# Patient Record
Sex: FEMALE | Race: WHITE | NOT HISPANIC OR LATINO | Employment: PART TIME | URBAN - METROPOLITAN AREA
[De-identification: names, ages, dates, MRNs, and addresses within clinical notes are randomized per-mention and may not be internally consistent; named-entity substitution may affect disease eponyms.]

---

## 2017-04-27 ENCOUNTER — GENERIC CONVERSION - ENCOUNTER (OUTPATIENT)
Dept: OTHER | Facility: OTHER | Age: 37
End: 2017-04-27

## 2017-05-03 ENCOUNTER — GENERIC CONVERSION - ENCOUNTER (OUTPATIENT)
Dept: OTHER | Facility: OTHER | Age: 37
End: 2017-05-03

## 2017-06-14 ENCOUNTER — LAB REQUISITION (OUTPATIENT)
Dept: LAB | Facility: HOSPITAL | Age: 37
End: 2017-06-14
Payer: COMMERCIAL

## 2017-06-14 ENCOUNTER — ALLSCRIPTS OFFICE VISIT (OUTPATIENT)
Dept: OTHER | Facility: OTHER | Age: 37
End: 2017-06-14

## 2017-06-14 DIAGNOSIS — D48.5 NEOPLASM OF UNCERTAIN BEHAVIOR OF SKIN: ICD-10-CM

## 2017-06-14 PROCEDURE — 88305 TISSUE EXAM BY PATHOLOGIST: CPT | Performed by: SPECIALIST

## 2017-10-09 ENCOUNTER — TRANSCRIBE ORDERS (OUTPATIENT)
Dept: ADMINISTRATIVE | Facility: HOSPITAL | Age: 37
End: 2017-10-09

## 2017-10-09 DIAGNOSIS — N64.4 BREAST PAIN, LEFT: Primary | ICD-10-CM

## 2017-10-11 ENCOUNTER — HOSPITAL ENCOUNTER (OUTPATIENT)
Dept: ULTRASOUND IMAGING | Facility: CLINIC | Age: 37
Discharge: HOME | End: 2017-10-11
Payer: COMMERCIAL

## 2017-10-11 ENCOUNTER — HOSPITAL ENCOUNTER (OUTPATIENT)
Dept: MAMMOGRAPHY | Facility: CLINIC | Age: 37
Discharge: HOME | End: 2017-10-11
Payer: COMMERCIAL

## 2017-10-11 DIAGNOSIS — N64.4 BREAST PAIN, LEFT: ICD-10-CM

## 2017-10-11 DIAGNOSIS — N64.4 MASTODYNIA: ICD-10-CM

## 2017-10-11 PROCEDURE — G0279 TOMOSYNTHESIS, MAMMO: HCPCS

## 2017-10-11 PROCEDURE — 76642 ULTRASOUND BREAST LIMITED: CPT

## 2017-10-11 PROCEDURE — G0204 DX MAMMO INCL CAD BI: HCPCS

## 2018-01-13 VITALS
SYSTOLIC BLOOD PRESSURE: 116 MMHG | RESPIRATION RATE: 16 BRPM | DIASTOLIC BLOOD PRESSURE: 77 MMHG | WEIGHT: 124.38 LBS | HEART RATE: 79 BPM | HEIGHT: 64 IN | BODY MASS INDEX: 21.24 KG/M2 | TEMPERATURE: 98.4 F

## 2018-01-15 NOTE — MISCELLANEOUS
Message   Date: 27 Apr 2017 9:59 AM EST, Recorded By: Elaine Cruz For: Sheree Noble, Reynaldo   Phone: [phone] (Home), [phone] (Work)   Reason: Medical Complaint   patient called c/o cough especially at night with some tightness in her chest   she states she is not sick, no congestion  She believes it is allergy related  She is using the Claritan and the Flonase  She is going to the [place] tomorrow and does not want to have a coughing fit while there  She is asking for an inhaler to use      As per Dr Luke Machado, she will prescribe Ventolin inhaler        Active Problems    1  Acid reflux disease (530 81) (K21 9)   2  Acute bronchitis (466 0) (J20 9)   3  Acute conjunctivitis (372 00) (H10 30)   4  Influenza-like illness (799 89) (R69)   5  Lumbago (724 2) (M54 5)   6  Need for DTaP vaccine (V06 1) (Z23)   7  Need for prophylactic vaccination and inoculation against influenza (V04 81) (Z23)   8  Need for Tdap vaccination (V06 1) (Z23)   9  Screening for hyperlipidemia (V77 91) (Z13 220)   10  Screening for hypothyroidism (V77 0) (Z13 29)    Current Meds   1  Fluticasone Propionate 50 MCG/ACT Nasal Suspension; USE 2 SPRAYS IN EACH   NOSTRIL ONCE DAILY; Therapy: 95LDR9133 to (Last Rx:70Xyn5341)  Requested for: 93Dte0780 Ordered   2  NexIUM 40 MG Oral Capsule Delayed Release; TAKE 1 CAPSULE DAILY; Therapy: 62FLK8965 to (Evaluate:45Gwz0840)  Requested for: 64MSK1454; Last   Rx:22Ekb2058 Ordered   3  Prenatal Multivitamin-Ultra TABS; Therapy: (Recorded:90Nys2613) to Recorded   4  Ventolin  (90 Base) MCG/ACT Inhalation Aerosol Solution; INHALE 1 TO 2   PUFFS EVERY 4 TO 6 HOURS AS NEEDED; Therapy: 21Erd0966 to (Last Rx:94Kxu0042)  Requested for: 41Opc2517 Ordered    Allergies    1   Amoxicillin CAPS    Signatures   Electronically signed by : GHANSHYAM Orta ; Apr 27 2017 10:55AM EST                       (Author)

## 2018-01-16 NOTE — PROGRESS NOTES
Assessment    1  Acute bronchitis (466 0) (J20 9)    Plan  Acute bronchitis    · Azithromycin 250 MG Oral Tablet; TAKE 2 TABLETS ON DAY 1 THEN TAKE 1  TABLET A DAY FOR 4 DAYS   · ProAir RespiClick 270 (90 Base) MCG/ACT Inhalation Aerosol Powder Breath  Activated; Inhale 1-2 puffs every 4-6 hours for cough and wheezing   · * XR CHEST PA & LATERAL; Status:Active; Requested for:22Jan2016;     Discussion/Summary    The high fevers with chest tightness is concerning  Chest xray to rule out pneumonia  Will start azithromycin  Very minimal excretion in breast milk so will be safe  Proair respiclick instead of symbicort  Continue tylenol, advil for symptomatic control  Possible side effects of new medications were reviewed with the patient/guardian today  The treatment plan was reviewed with the patient/guardian  The patient/guardian understands and agrees with the treatment plan      Chief Complaint  fever since 1/19/16 to Urgent Care, negative for influenza,   today has cough, green phlegm, chills, joints burn      History of Present Illness  HPI: Ms Demetria Naik comes for an acute visit  For the last 3 days she has had fever, bodyaches  Since yesterday she started with a cough with minimal greenish sputum  She also experienced chest tightness  She was seen in urgent care and tested for influenza on 1/20/16 which was negative  She is using symbicort but she thinks she is not able to take it in well as she coughs out right away  She denies any dysuria or diarrhea  Breastfeeding a 11 month old son who is in good health lately  Review of Systems    Constitutional: fever, feeling poorly, chills and feeling tired  ENT: nasal discharge, but no sore throat  Cardiovascular: no chest pain and no palpitations  Respiratory: cough, but no shortness of breath, no wheezing and no shortness of breath during exertion  Gastrointestinal: no abdominal pain and no diarrhea  Genitourinary: no dysuria     Musculoskeletal: myalgias  Active Problems    1  Acute conjunctivitis (372 00) (H10 30)   2  Esophageal reflux (530 81) (K21 9)   3  Influenza-like illness (487 1) (J11 1)   4  Lumbago (724 2) (M54 5)   5  Need for DTaP vaccine (V06 1) (Z23)   6  Need for prophylactic vaccination and inoculation against influenza (V04 81) (Z23)   7  Need for Tdap vaccination (V06 1) (Z23)    Past Medical History  Active Problems And Past Medical History Reviewed: The active problems and past medical history were reviewed and updated today  Social History    · Being A Social Drinker   · Marital History - Currently    · Never A Smoker   · Working Full Time  The social history was reviewed and updated today  The social history was reviewed and is unchanged  Family History  Family History Reviewed: The family history was reviewed and updated today  Current Meds   1  Fluticasone Propionate 50 MCG/ACT Nasal Suspension; USE 2 SPRAYS IN EACH   NOSTRIL ONCE DAILY; Therapy: 32WPN3756 to (Last Rx:51Pdd8843)  Requested for: 15Wgs1856 Ordered   2  NexIUM 40 MG Oral Capsule Delayed Release; TAKE 1 CAPSULE DAILY; Therapy: 32IWO4934 to (Evaluate:10Aug2014)  Requested for: 57KHZ7506; Last   Rx:76Csj9487 Ordered   3  Prenatal Multivitamin-Ultra TABS; Therapy: (Recorded:71Vme4038) to Recorded    The medication list was reviewed and updated today  Allergies    1  Amoxicillin CAPS    Vitals   Recorded: 47BFY9901 10:43AM   Temperature 102 F   Heart Rate 116   Respiration 16   Systolic 618   Diastolic 80   Height 5 ft 4 in   Weight 124 lb 4 00 oz   BMI Calculated 21 33   BSA Calculated 1 61     Physical Exam    Constitutional   General appearance: No acute distress, well appearing and well nourished  Eyes   Conjunctiva and lids: No swelling, erythema or discharge  Pupils and irises: Equal, round and reactive to light      Ears, Nose, Mouth, and Throat   External inspection of ears and nose: Normal     Otoscopic examination: Tympanic membranes translucent with normal light reflex  Canals patent without erythema  Oropharynx: Abnormal   The posterior pharynx was erythematous, but did not have an exudate  Oral mucosa was moist, but was normal    Pulmonary   Respiratory effort: Abnormal   Respiratory rate: normal  Respiratory Findings: dry cough  Auscultation of lungs: Clear to auscultation  Cardiovascular   Auscultation of heart: Normal rate and rhythm, normal S1 and S2, without murmurs  Examination of extremities for edema and/or varicosities: Normal     Abdomen   Abdomen: Non-tender, no masses  Liver and spleen: No hepatomegaly or splenomegaly           Signatures   Electronically signed by : GHANSHYAM Marquis ; Jan 22 2016 11:29AM EST                       (Author)

## 2018-01-16 NOTE — MISCELLANEOUS
Message  Ms Zuleima Hampton called stating that she still has cough which seems to be worse yesterday and today  No fever and feels much better in her overall energy level  I let her know that sometimes the cough may take sometime to completely resolve  She finished the course of antibiotics and using symbicort and proair as needed  I asked her to schedule a follow up appointment 6-8 weeks from initial visit and to get an chest xray before that  We will mail her the slip for the xray      Plan  Community acquired pneumonia    · * XR CHEST PA & LATERAL; Status:Active;  Requested for:11Mar2016;     Signatures   Electronically signed by : GHANSHYAM Melo ; Feb  3 2016 10:04AM EST                       (Author)

## 2018-01-16 NOTE — RESULT NOTES
Message      Recorded as Task   Date: 05/03/2016 02:06 PM, Created By: Erik Mosquera   Task Name: Follow Up   Assigned To: Vidal Lama   Regarding Patient: Annalee Zamora, Status: In Progress   Josefaald Min - 03 May 2016 2:06 PM     TASK CREATED  Please let her know that blood work was all within normal limits  Neto Shemar - 05 May 2016 9:38 AM     TASK EDITED  Call patient and left a message for her to call back  Alena Ferreira - 05 May 2016 9:38 AM     TASK IN PROGRESS   Alena Ferreira - 05 May 2016 10:31 AM     TASK EDITED  Patient call back told the patient and let her know that her blood work was within normal limits

## 2018-01-18 NOTE — PROGRESS NOTES
Assessment    1  Influenza-like illness (487 1) (J11 1)    Plan  Influenza-like illness    · Drink at least 6 glasses of water or juice a day ; Status:Complete;   Done: 42ORG1161   · Good hand washing is one of the best ways to control the spread of germs ;  Status:Complete;   Done: 00IUK9552   · Rest in bed until your temperature returns to normal ; Status:Complete;   Done:  79TAP9877   · Stay home from work or school until your condition is improved ; Status:Complete;    Done: 43MIL6052   · The following home treatments may soothe a sore throat ; Status:Complete;   Done:  04DCA3507   · Use a cool mist humidifier in the room ; Status:Complete;   Done: 48LIM3032   · You may slowly resume your normal level of activity once you feel better ;  Status:Complete;   Done: 91OJT0554   · Call [phone] if: The cough is getting worse ; Status:Complete;   Done:  84ITX6568   · Call [phone] if: The cough is not gone in 10 days ; Status:Complete;   Done:  27UAG2085   · Call [phone] if: The fever comes back after being normal for 2 days ;  Status:Complete;   Done: 32SFF7891   · Call [phone] if:  The fever has not gone away in 2 days ; Status:Complete;   Done:  26QGL5478   · Call [phone] if: You have a productive cough and are bringing up secretions  (phlegm) ; Status:Complete;   Done: 40HHX8507   · Call [phone] if: You have pain in your ear ; Status:Complete;   Done: 25EJU9003   · Call [phone] if: You have pain in your face, cheeks or forehead ; Status:Complete;    Done: 01QCB7428   · Call [phone] if: You start vomiting ; Status:Complete;   Done: 14QQY1042   · Call [phone] if: Your temperature is higher than 101F ; Status:Complete;   Done:  36VEK5904   · Seek Immediate Medical Attention if: Breathing starts to have a wheeze or whistling  sound ; Status:Complete;   Done: 74YVK5421   · Seek Immediate Medical Attention if: The fever continues or becomes higher ;  Status:Complete;   Done: 39MWU1842   · Seek Immediate Medical Attention if: You are feeling short of breath ; Status:Complete;    Done: 43VFX9656   · Seek Immediate Medical Attention if: You become dehydrated ; Status:Complete;   Done:  08DDX4567   · Seek Immediate Medical Attention if: You feel short of breath even while resting ;  Status:Complete;   Done: 18PWK3345   · Seek Immediate Medical Attention if: You have a fever, headache, and vomiting, or have a  stiff neck ; Status:Complete;   Done: 00QFA2241   · Seek Immediate Medical Attention if: You have pain in the chest that gets worse with  deep breathing or coughing ; Status:Complete;   Done: 09GBE4749   · (1) INFLUENZA A/B AND RSV, PCR; Status:Active - Retrospective By Protocol  Authorization; Requested KLX:[id number];     Discussion/Summary  Discussion Summary:   Patient symptoms consistent with an influenza-like illness  Flu swab obtained and sent for culture  Increase fluids and rest  Tylenol and Advil as needed for fever  Monitor for fever, chills, worsening of current symptoms, difficulty breathing or swelling  With these symptoms, or no improvement in 3-5 days, follow up with PCP or ER for further evaluation  We will contact you with positive test results only  Medication Side Effects Reviewed: Possible side effects of new medications were reviewed with the patient/guardian today  Understands and agrees with treatment plan: The treatment plan was reviewed with the patient/guardian  The patient/guardian understands and agrees with the treatment plan   Counseling Documentation With Imm: The patient was counseled regarding instructions for management, risk factor reductions, prognosis, patient and family education, impressions, risks and benefits of treatment options, importance of compliance with treatment  Follow Up Instructions: Follow Up with your Primary Care Provider in 3-5 days     If your symptoms worsen, go to the [place] [address] Emergency Department  Chief Complaint    1  Fever  Chief Complaint Free Text Note Form: Patient here with a fever and chills for 2 days  She has been taking Ibuprofen for same  Denies pain  History of Present Illness  HPI: 52-year-old female presents with two-day history of fever, chills  MAXIMUM TEMPERATURE 102  She denies cough, rhinorrhea, sore throat, chest pain, shortness of breath, abdominal pain, nausea, vomiting  Tolerating by mouth diet  Positive sick contacts at home  Ibuprofen helps with the fever       Hospital Based Practices Required Assessment:   Pain Assessment   the patient states they do not have pain  Abuse And Domestic Violence Screen    Yes, the patient is safe at home  The patient states no one is hurting them  Depression And Suicide Screen  No, the patient has not had thoughts of hurting themself  No, the patient has not felt depressed in the past 7 days  Prefered Language is  [place]  Primary Language is  English  Review of Systems  Focused-Female:   Constitutional: as noted in HPI    ENT: as noted in HPI  Cardiovascular: as noted in HPI  Respiratory: as noted in HPI  Gastrointestinal: as noted in HPI  Active Problems    1  Acute conjunctivitis (372 00) (H10 30)   2  Esophageal reflux (530 81) (K21 9)   3  Lumbago (724 2) (M54 5)   4  Need for DTaP vaccine (V06 1) (Z23)   5  Need for prophylactic vaccination and inoculation against influenza (V04 81) (Z23)   6  Need for Tdap vaccination (V06 1) (Z23)    Past Medical History    1  Nasal congestion (478 19) (R09 81)    Family History    1  Family history of Dyslipidemia    2  Family history of Asthma (V17 5)    3  Family history of Congestive Heart Failure    Social History    · Being A Social Drinker   · Marital History - Currently    · Never A Smoker   · Working Full Time    Current Meds   1  Fluticasone Propionate 50 MCG/ACT Nasal Suspension; USE 2 SPRAYS IN EACH   NOSTRIL ONCE DAILY;    Therapy: 67MPD1760 to (Last Rx:77Pga9038)  Requested for: 42Mks6094 Ordered   2  NexIUM 40 MG Oral Capsule Delayed Release; TAKE 1 CAPSULE DAILY; Therapy: 53NNM2855 to (Evaluate:09Mtq2315)  Requested for: 13MGA5879; Last   Rx:40Cqw7221 Ordered   3  Prenatal Multivitamin-Ultra TABS; Therapy: (Recorded:30Syw3793) to Recorded    Allergies    1  Amoxicillin CAPS    Vitals  Signs [Data Includes: Current Encounter]   Recorded: 25FEC8470 09:25PM   Temperature: 100 9 F  Heart Rate: 116  Respiration: 16  Systolic: 924  Diastolic: 66  Height: 5 ft 4 in  Weight: 125 lb   BMI Calculated: 21 46  BSA Calculated: 1 6  O2 Saturation: 97  Pain Scale: 0    Physical Exam    Constitutional   General appearance: No acute distress, well appearing and well nourished  Eyes   Conjunctiva and lids: No swelling, erythema or discharge  Pupils and irises: Equal, round and reactive to light  Ears, Nose, Mouth, and Throat   External inspection of ears and nose: Normal     Otoscopic examination: Tympanic membranes translucent with normal light reflex  Canals patent without erythema  Nasal mucosa, septum, and turbinates: Normal without edema or erythema  Oropharynx: Normal with no erythema, edema, exudate or lesions  Pulmonary   Respiratory effort: No increased work of breathing or signs of respiratory distress  Auscultation of lungs: Clear to auscultation  Cardiovascular   Auscultation of heart: Normal rate and rhythm, normal S1 and S2, without murmurs  Lymphatic   Palpation of lymph nodes in neck: No lymphadenopathy         Signatures   Electronically signed by : RICKI Liu; Jan 20 2016  9:47PM EST                       (Author)    Electronically signed by : GHANSHYAM Goins ; Jan 21 2016  9:47AM EST                       (Co-author)

## 2018-10-11 ENCOUNTER — IMMUNIZATION (OUTPATIENT)
Dept: INTERNAL MEDICINE CLINIC | Facility: CLINIC | Age: 38
End: 2018-10-11
Payer: COMMERCIAL

## 2018-10-11 DIAGNOSIS — Z23 ENCOUNTER FOR IMMUNIZATION: ICD-10-CM

## 2018-10-11 PROCEDURE — 90682 RIV4 VACC RECOMBINANT DNA IM: CPT | Performed by: INTERNAL MEDICINE

## 2018-10-11 PROCEDURE — 90471 IMMUNIZATION ADMIN: CPT | Performed by: INTERNAL MEDICINE

## 2019-01-21 ENCOUNTER — OFFICE VISIT (OUTPATIENT)
Dept: INTERNAL MEDICINE CLINIC | Facility: CLINIC | Age: 39
End: 2019-01-21
Payer: COMMERCIAL

## 2019-01-21 VITALS
HEIGHT: 64 IN | WEIGHT: 127 LBS | RESPIRATION RATE: 14 BRPM | SYSTOLIC BLOOD PRESSURE: 100 MMHG | TEMPERATURE: 98.4 F | HEART RATE: 100 BPM | DIASTOLIC BLOOD PRESSURE: 69 MMHG | BODY MASS INDEX: 21.68 KG/M2

## 2019-01-21 DIAGNOSIS — J30.9 ALLERGIC RHINITIS, UNSPECIFIED SEASONALITY, UNSPECIFIED TRIGGER: ICD-10-CM

## 2019-01-21 DIAGNOSIS — K21.9 GASTROESOPHAGEAL REFLUX DISEASE WITHOUT ESOPHAGITIS: Primary | ICD-10-CM

## 2019-01-21 PROBLEM — H65.02 ACUTE SEROUS OTITIS MEDIA OF LEFT EAR: Status: ACTIVE | Noted: 2019-01-21

## 2019-01-21 PROCEDURE — 99214 OFFICE O/P EST MOD 30 MIN: CPT | Performed by: INTERNAL MEDICINE

## 2019-01-21 PROCEDURE — 1036F TOBACCO NON-USER: CPT | Performed by: INTERNAL MEDICINE

## 2019-01-21 PROCEDURE — 3008F BODY MASS INDEX DOCD: CPT | Performed by: INTERNAL MEDICINE

## 2019-01-21 RX ORDER — LORATADINE 10 MG/1
10 TABLET ORAL DAILY
COMMUNITY

## 2019-01-21 RX ORDER — AZELASTINE 1 MG/ML
1 SPRAY, METERED NASAL 2 TIMES DAILY
Qty: 1 BOTTLE | Refills: 2 | Status: SHIPPED | OUTPATIENT
Start: 2019-01-21 | End: 2021-03-29 | Stop reason: SDUPTHER

## 2019-01-21 RX ORDER — NORETHINDRONE ACETATE AND ETHINYL ESTRADIOL 1MG-20(21)
1 KIT ORAL DAILY
COMMUNITY

## 2019-01-21 RX ORDER — FLUTICASONE PROPIONATE 50 MCG
2 SPRAY, SUSPENSION (ML) NASAL DAILY
Qty: 1 BOTTLE | Refills: 2 | Status: SHIPPED | OUTPATIENT
Start: 2019-01-21 | End: 2021-10-13

## 2019-01-21 NOTE — PROGRESS NOTES
Assessment/Plan:    Acute serous otitis media of left ear  Had pain in the left ear this morning with nasal congestion for about a week or so ago with a background history of some allergic rhinitis to Claritin D without much relieve and ibuprofen will going to start her on Flonase nasal spray and Astelin nasal spray continue on Claritin or Zyrtec if symptoms persist consider Singulair long-term  If you develop fever or yellow drainage will send in an antibiotic call us with a with progress report before the weekend       Diagnoses and all orders for this visit:    Gastroesophageal reflux disease without esophagitis    Allergic rhinitis, unspecified seasonality, unspecified trigger  -     fluticasone (FLONASE) 50 mcg/act nasal spray; 2 sprays into each nostril daily for 30 days  -     azelastine (ASTELIN) 0 1 % nasal spray; 1 spray into each nostril 2 (two) times a day for 30 days Use in each nostril as directed    Other orders  -     norethindrone-ethinyl estradiol (JUNEL FE 1/20) 1-20 MG-MCG per tablet; Take 1 tablet by mouth daily  -     loratadine (CLARITIN) 10 mg tablet; Take 10 mg by mouth daily          Subjective:      Patient ID: Marlys Chaney is a 41 y o  female      Chief Complaint   Patient presents with    Other     possible pink eye-right eye    Earache     left ear    Facial Pain         Current Outpatient Prescriptions:     loratadine (CLARITIN) 10 mg tablet, Take 10 mg by mouth daily, Disp: , Rfl:     norethindrone-ethinyl estradiol (JUNEL FE 1/20) 1-20 MG-MCG per tablet, Take 1 tablet by mouth daily, Disp: , Rfl:     azelastine (ASTELIN) 0 1 % nasal spray, 1 spray into each nostril 2 (two) times a day for 30 days Use in each nostril as directed, Disp: 1 Bottle, Rfl: 2    fluticasone (FLONASE) 50 mcg/act nasal spray, 2 sprays into each nostril daily for 30 days, Disp: 1 Bottle, Rfl: 2    Came in urgently because she felt earache throbbing in her ear today she says some nasal congestion sinus pressure for about a week or so of her children has some cold recently  Has taking Claritin D came in for further evaluation  As taking some ibuprofen for pain  Appears nontoxic no fever  Has a background history of little bit allergic rhinitis at times no purulent discharge from her nose no cough no chest pain palpitation or shortness of breath  No GI symptoms fever or chills  We will do the following start her on Flonase nasal spray and Astelin nasal spray continue Claritin D take ibuprofen or Aleve for pain if symptoms persist could be a candidate for Singulair if you develop purulent discharge or fever call us and will send in an antibiotic  The following portions of the patient's history were reviewed and updated as appropriate: allergies, current medications, past family history, past medical history, past social history, past surgical history and problem list     Review of Systems   Constitutional: Negative  Negative for activity change, appetite change, fatigue, fever and unexpected weight change  HENT: Positive for ear pain, postnasal drip, rhinorrhea and sinus pressure  Negative for congestion, hearing loss, mouth sores, sore throat, trouble swallowing and voice change  Eyes: Negative for pain, redness and visual disturbance  Respiratory: Negative for cough, chest tightness, shortness of breath and wheezing  Cardiovascular: Negative for chest pain, palpitations and leg swelling  Gastrointestinal: Negative for abdominal distention, abdominal pain, blood in stool, constipation, diarrhea and nausea  Endocrine: Negative for cold intolerance, heat intolerance, polydipsia, polyphagia and polyuria  Genitourinary: Negative for difficulty urinating, dysuria, flank pain, frequency, hematuria and urgency  Musculoskeletal: Negative for arthralgias, back pain, gait problem, joint swelling and myalgias  Skin: Negative for color change and pallor     Neurological: Negative for dizziness, tremors, seizures, syncope, weakness, numbness and headaches  Hematological: Negative for adenopathy  Does not bruise/bleed easily  Psychiatric/Behavioral: Negative  Negative for sleep disturbance  The patient is not nervous/anxious  Objective:    Results for orders placed or performed in visit on 06/14/17   Tissue Exam   Result Value Ref Range    Case Report       Surgical Pathology Report                         Case: [id number]                                   Authorizing Provider:  Jose Elias  Collected:           06/14/2017                   Pathologist:           Letty Ellis MD           Received:            06/14/2017 1541              Specimen:    Skin, Other, Right neck                                                                    Final Diagnosis       A  Skin, Right neck, shave excision:  - Benign irritated keratosis, macular/evolving   - Negative for malignancy on multiple examined levels  Interpretation performed at [facility], [address] [place] [address]  Additional Information       These tests were developed and their performance characteristics determined by [facility] or [address] [place] [address]  They may not be cleared or approved by the [place]  Food and Drug Administration  The FDA has determined that such clearance or approval is not necessary  These tests are used for clinical purposes  They should not be regarded as investigational or for research  This laboratory has been approved by CLIA 88, designated as a high-complexity laboratory and is qualified to perform these tests  Gross Description       A  The specimen is received in formalin, labeled with the patient's name and hospital number, and is designated "right neck", is a shave biopsy of skin measuring 0 4 x 0 3 x 0 1 cm   The epidermal surface is tan-white and contains a 0 1 x 0 1 cm flesh-colored papule that is 0 1 cm away from the nearest resection margin  The resection margin is inked green  The specimen is entirely submitted  One cassette  The fixation time is unknown  RLR      Clinical Information DDX: BCC vs  AK  Notes: Please check margins        /69 (BP Location: Left arm, Patient Position: Sitting, Cuff Size: Standard)   Pulse 100   Temp 98 4 °F (36 9 °C)   Resp 14   Ht 5' 3 75" (1 619 m)   Wt 57 6 kg (127 lb)   BMI 21 97 kg/m²      Physical Exam   Constitutional: She is oriented to person, place, and time  She appears well-developed and well-nourished  HENT:   Head: Normocephalic  Right Ear: External ear normal    Left Ear: External ear normal    Nose: Nose normal    Mouth/Throat: Oropharynx is clear and moist  No oropharyngeal exudate  Tympanic members clear swollen nasal turbinates  Throat is clear   Eyes: Pupils are equal, round, and reactive to light  Conjunctivae and EOM are normal    Neck: Normal range of motion  Neck supple  No thyromegaly present  No lymphadenopathy   Cardiovascular: Normal rate, regular rhythm, normal heart sounds and intact distal pulses  Exam reveals no gallop and no friction rub  No murmur heard  Pulmonary/Chest: Effort normal and breath sounds normal  No respiratory distress  She has no wheezes  She has no rales  Abdominal: Soft  Bowel sounds are normal  She exhibits no distension and no mass  There is no tenderness  There is no rebound and no guarding  Musculoskeletal: Normal range of motion  Lymphadenopathy:     She has no cervical adenopathy  Neurological: She is alert and oriented to person, place, and time  Skin: Skin is warm and dry  Psychiatric: She has a normal mood and affect  Her behavior is normal  Judgment normal    Nursing note and vitals reviewed

## 2019-01-21 NOTE — PATIENT INSTRUCTIONS
Serous Otitis Media   WHAT YOU NEED TO KNOW:   Serous otitis media is fluid trapped behind your tympanic membrane (eardrum), without an ear infection  Your eardrum is in your middle ear  Serous otitis media is also called otitis media with effusion  You may have fluid in your ear for months, but it usually goes away on its own  The fluid may be in one or both ears  The fluid may cause muffled sounds, and you may feel like your ears are full  Serous otitis media may be caused by an upper respiratory infection or allergies  It is most common in the fall and early spring  DISCHARGE INSTRUCTIONS:   Return to the emergency department if:   · You have a fever  · You have a sudden loss of hearing in your affected ear  · You develop a severe headache and stiff neck  · You have a seizure  Contact your healthcare provider if:   · You have fluid draining from your ear  · You have new symptoms  · You have questions or concerns about your condition or care  Follow up with your healthcare provider as directed: Your ears will need to be checked regularly  You may need to see a specialist  Write down your questions so you remember to ask them during your visits  © 2017 [facility] Information is for End User's use only and may not be sold, redistributed or otherwise used for commercial purposes  All illustrations and images included in CareNotes® are the copyrighted property of A D A M , Inc  or Tejinderjorge l [employer]man  The above information is an  only  It is not intended as medical advice for individual conditions or treatments  Talk to your doctor, nurse or pharmacist before following any medical regimen to see if it is safe and effective for you      Take her Flonase nasal spray 2 puffs in each nostril daily    Get take her Astelin nasal spray 2 puffs in each nostril daily    Continue Claritin D    May take ibuprofen for on Aleve for pain    If you develop fever or yellow drainage call us before the weekend and will start antibiotics    Symptoms recurred you may be a candidate for Singulair long-term to avoid allergies    And/or ENT evaluation

## 2019-01-21 NOTE — ASSESSMENT & PLAN NOTE
Had pain in the left ear this morning with nasal congestion for about a week or so ago with a background history of some allergic rhinitis to Claritin D without much relieve and ibuprofen will going to start her on Flonase nasal spray and Astelin nasal spray continue on Claritin or Zyrtec if symptoms persist consider Singulair long-term    If you develop fever or yellow drainage will send in an antibiotic call us with a with progress report before the weekend

## 2019-01-31 ENCOUNTER — TELEPHONE (OUTPATIENT)
Dept: INTERNAL MEDICINE CLINIC | Facility: CLINIC | Age: 39
End: 2019-01-31

## 2019-01-31 DIAGNOSIS — J20.8 ACUTE BRONCHITIS DUE TO OTHER SPECIFIED ORGANISMS: Primary | ICD-10-CM

## 2019-01-31 RX ORDER — PREDNISONE 10 MG/1
TABLET ORAL
Qty: 30 TABLET | Refills: 0 | Status: SHIPPED | OUTPATIENT
Start: 2019-01-31 | End: 2021-05-13 | Stop reason: ALTCHOICE

## 2019-01-31 RX ORDER — ALBUTEROL SULFATE 90 UG/1
2 AEROSOL, METERED RESPIRATORY (INHALATION) EVERY 4 HOURS PRN
Qty: 1 INHALER | Refills: 1 | Status: SHIPPED | OUTPATIENT
Start: 2019-01-31

## 2019-01-31 RX ORDER — FLUTICASONE PROPIONATE 110 UG/1
AEROSOL, METERED RESPIRATORY (INHALATION)
Qty: 1 INHALER | Refills: 1 | Status: SHIPPED | OUTPATIENT
Start: 2019-01-31 | End: 2021-05-13 | Stop reason: ALTCHOICE

## 2019-01-31 RX ORDER — AZITHROMYCIN 250 MG/1
TABLET, FILM COATED ORAL
Qty: 6 TABLET | Refills: 0 | Status: SHIPPED | OUTPATIENT
Start: 2019-01-31 | End: 2019-02-04

## 2019-01-31 NOTE — ASSESSMENT & PLAN NOTE
Call concerning coughing yellow phlegm wheezy feels better taking the albuterol inhaler had a history of pneumonia in the distant past does not have any chest pain no fever or chills phlegm is yellow we did the following  Continue the your albuterol inhaler at least 2 puffs twice a day we added Flovent inhaler 1102 puffs twice a day  We gave her a Z-Anjum  We gave her tapering doses of prednisone  Symptoms do not improve chest x-ray an office visit  With Dr Clay Counter

## 2019-01-31 NOTE — TELEPHONE ENCOUNTER
Phone call:  C/o very congested, scratchy throat, cough with yellow phlegm, no fever  Rx sent for Z-Anjum, Flovent 110 mcg and Ventolin Inhaler

## 2019-02-04 ENCOUNTER — TELEPHONE (OUTPATIENT)
Dept: INTERNAL MEDICINE CLINIC | Facility: CLINIC | Age: 39
End: 2019-02-04

## 2019-02-04 NOTE — TELEPHONE ENCOUNTER
Called patient to see how she was feeling today  She states that she is feeling much better  No cough  Finishing the antibiotic and is weening on the Prednisone  She did not get the Flovent filled because it was not covered by her insurance  She did use the Ventolin until Saturday and then has not had to use it    Overall, doing much better

## 2019-10-04 ENCOUNTER — IMMUNIZATIONS (OUTPATIENT)
Dept: INTERNAL MEDICINE CLINIC | Facility: CLINIC | Age: 39
End: 2019-10-04
Payer: COMMERCIAL

## 2019-10-04 DIAGNOSIS — Z23 FLU VACCINE NEED: Primary | ICD-10-CM

## 2019-10-04 PROCEDURE — 90682 RIV4 VACC RECOMBINANT DNA IM: CPT

## 2019-10-04 PROCEDURE — 90471 IMMUNIZATION ADMIN: CPT

## 2020-11-03 ENCOUNTER — IMMUNIZATIONS (OUTPATIENT)
Dept: INTERNAL MEDICINE CLINIC | Facility: CLINIC | Age: 40
End: 2020-11-03
Payer: COMMERCIAL

## 2020-11-03 DIAGNOSIS — Z23 ENCOUNTER FOR IMMUNIZATION: Primary | ICD-10-CM

## 2020-11-03 PROCEDURE — 90682 RIV4 VACC RECOMBINANT DNA IM: CPT

## 2020-11-03 PROCEDURE — 90471 IMMUNIZATION ADMIN: CPT

## 2020-12-30 ENCOUNTER — IMMUNIZATIONS (OUTPATIENT)
Dept: FAMILY MEDICINE CLINIC | Facility: HOSPITAL | Age: 40
End: 2020-12-30
Payer: COMMERCIAL

## 2020-12-30 DIAGNOSIS — Z23 ENCOUNTER FOR IMMUNIZATION: ICD-10-CM

## 2020-12-30 PROCEDURE — 0011A SARS-COV-2 / COVID-19 MRNA VACCINE (MODERNA) 100 MCG: CPT

## 2020-12-30 PROCEDURE — 91301 SARS-COV-2 / COVID-19 MRNA VACCINE (MODERNA) 100 MCG: CPT

## 2021-01-25 ENCOUNTER — IMMUNIZATIONS (OUTPATIENT)
Dept: FAMILY MEDICINE CLINIC | Facility: HOSPITAL | Age: 41
End: 2021-01-25

## 2021-01-25 DIAGNOSIS — Z23 ENCOUNTER FOR IMMUNIZATION: Primary | ICD-10-CM

## 2021-01-25 PROCEDURE — 0012A SARS-COV-2 / COVID-19 MRNA VACCINE (MODERNA) 100 MCG: CPT

## 2021-01-25 PROCEDURE — 91301 SARS-COV-2 / COVID-19 MRNA VACCINE (MODERNA) 100 MCG: CPT

## 2021-03-29 DIAGNOSIS — J30.9 ALLERGIC RHINITIS, UNSPECIFIED SEASONALITY, UNSPECIFIED TRIGGER: ICD-10-CM

## 2021-03-29 RX ORDER — AZELASTINE 1 MG/ML
1 SPRAY, METERED NASAL 2 TIMES DAILY
Qty: 1 BOTTLE | Refills: 3 | Status: SHIPPED | OUTPATIENT
Start: 2021-03-29 | End: 2021-10-13

## 2021-05-13 ENCOUNTER — OFFICE VISIT (OUTPATIENT)
Dept: INTERNAL MEDICINE CLINIC | Facility: CLINIC | Age: 41
End: 2021-05-13
Payer: COMMERCIAL

## 2021-05-13 VITALS
HEART RATE: 70 BPM | SYSTOLIC BLOOD PRESSURE: 110 MMHG | RESPIRATION RATE: 12 BRPM | HEIGHT: 64 IN | WEIGHT: 137 LBS | DIASTOLIC BLOOD PRESSURE: 72 MMHG | TEMPERATURE: 98.2 F | BODY MASS INDEX: 23.39 KG/M2

## 2021-05-13 DIAGNOSIS — Z00.00 PHYSICAL EXAM, ANNUAL: Primary | ICD-10-CM

## 2021-05-13 DIAGNOSIS — F41.9 MILD ANXIETY: ICD-10-CM

## 2021-05-13 PROBLEM — J20.8 ACUTE BRONCHITIS DUE TO OTHER SPECIFIED ORGANISMS: Status: RESOLVED | Noted: 2019-01-31 | Resolved: 2021-05-13

## 2021-05-13 PROBLEM — H65.02 ACUTE SEROUS OTITIS MEDIA OF LEFT EAR: Status: RESOLVED | Noted: 2019-01-21 | Resolved: 2021-05-13

## 2021-05-13 PROCEDURE — 3725F SCREEN DEPRESSION PERFORMED: CPT | Performed by: INTERNAL MEDICINE

## 2021-05-13 PROCEDURE — 1036F TOBACCO NON-USER: CPT | Performed by: INTERNAL MEDICINE

## 2021-05-13 PROCEDURE — 99395 PREV VISIT EST AGE 18-39: CPT | Performed by: INTERNAL MEDICINE

## 2021-05-13 PROCEDURE — 3008F BODY MASS INDEX DOCD: CPT | Performed by: INTERNAL MEDICINE

## 2021-05-13 NOTE — PROGRESS NOTES
Assessment/Plan     Healthy female exam      1  Some irritability and emotional issues changes in birth control have not significantly helped the symptoms  We discussed the possibility of pharmacological therapy with the SSRI verses behavioral therapy  This point she would like to start with behavioral therapy and pharmacological therapy can be considered if symptom improvement is not enough  She is exercising regularly  Screening for vitamin-D deficiency and hypothyroidism will be considered as well  Fasting blood sugar and lipid panel will be done for screening blood work  She completed COVID vaccination  2  Patient Counseling:  --Nutrition: Stressed importance of moderation in sodium/caffeine intake, saturated fat and cholesterol, caloric balance, sufficient intake of fresh fruits, vegetables, fiber, calcium, iron, and 1 mg of folate supplement per day (for females capable of pregnancy)  --Exercise: Stressed the importance of regular exercise  --Immunizations reviewed and updated  --Metabolic screening was reviewed and updated  --Cancer screening was reviewed and updated    3  Discussed the patient's BMI with her  The BMI is in the acceptable range  4  Follow up in one year  Rey Bullard is a 45 y o  female and is here for a comprehensive physical exam  The patient reports Noticed irritability and gets easily upset with her kids  She noticed that these changes were more prominent around her menstrual cycle, she discussed with her gynecologist in takes 3 cycles of uninterrupted OCP with 1 week of placebo  It helped a little but not enough, she has also increased her exercise regimen which has also helped       The following portions of the patient's history were reviewed and updated as appropriate: current medications, past medical history, past social history and past surgical history      Review of Systems  Review of Systems   Constitutional: Negative for fatigue, fever and unexpected weight change  HENT: Negative for ear pain, hearing loss and sore throat  Eyes: Negative for pain and discharge  Respiratory: Negative for cough, chest tightness and shortness of breath  Cardiovascular: Negative for chest pain and palpitations  Gastrointestinal: Negative for abdominal pain, blood in stool, constipation, diarrhea and nausea  Genitourinary: Negative for dysuria, frequency and hematuria  Musculoskeletal: Negative for arthralgias and joint swelling  Skin: Negative for rash  Allergic/Immunologic: Negative for immunocompromised state  Neurological: Negative for dizziness and headaches  Hematological: Negative for adenopathy  Psychiatric/Behavioral: Negative for confusion and sleep disturbance  /72 (BP Location: Left arm, Patient Position: Sitting, Cuff Size: Standard)   Pulse 70   Temp 98 2 °F (36 8 °C)   Resp 12   Ht 5' 3 75" (1 619 m)   Wt 62 1 kg (137 lb)   BMI 23 70 kg/m²      Physical Exam  Vitals signs and nursing note reviewed  Constitutional:       Appearance: Normal appearance  She is well-developed  HENT:      Head: Normocephalic and atraumatic  Right Ear: Tympanic membrane normal       Left Ear: Tympanic membrane normal       Nose: Nose normal    Eyes:      General:         Right eye: No discharge  Left eye: No discharge  Conjunctiva/sclera: Conjunctivae normal       Pupils: Pupils are equal, round, and reactive to light  Neck:      Musculoskeletal: Normal range of motion and neck supple  Thyroid: No thyromegaly  Cardiovascular:      Rate and Rhythm: Normal rate and regular rhythm  Chest Wall: PMI is not displaced  Heart sounds: Normal heart sounds, S1 normal and S2 normal  No murmur  Pulmonary:      Effort: Pulmonary effort is normal  No accessory muscle usage or respiratory distress  Breath sounds: Normal breath sounds  No rhonchi or rales     Abdominal:      General: Bowel sounds are normal       Palpations: Abdomen is soft  There is no shifting dullness  Tenderness: There is no abdominal tenderness  There is no rebound  Musculoskeletal: Normal range of motion  General: No tenderness  Lymphadenopathy:      Cervical: No cervical adenopathy  Skin:     General: Skin is warm  Findings: No rash  Neurological:      Mental Status: She is alert     Psychiatric:         Speech: Speech normal

## 2021-05-24 ENCOUNTER — TELEPHONE (OUTPATIENT)
Dept: INTERNAL MEDICINE CLINIC | Facility: CLINIC | Age: 41
End: 2021-05-24

## 2021-05-24 NOTE — TELEPHONE ENCOUNTER
Spoke to patient and reviewed the results      ----- Message from Nikolas Mckenzie MD sent at 5/24/2021 11:25 AM EDT -----  Please let patient know that test did not have any major abnormalitites

## 2021-06-16 ENCOUNTER — TELEPHONE (OUTPATIENT)
Dept: PSYCHIATRY | Facility: CLINIC | Age: 41
End: 2021-06-16

## 2021-10-13 ENCOUNTER — OFFICE VISIT (OUTPATIENT)
Dept: INTERNAL MEDICINE CLINIC | Facility: CLINIC | Age: 41
End: 2021-10-13
Payer: COMMERCIAL

## 2021-10-13 VITALS
TEMPERATURE: 97.5 F | HEIGHT: 64 IN | SYSTOLIC BLOOD PRESSURE: 117 MMHG | DIASTOLIC BLOOD PRESSURE: 73 MMHG | BODY MASS INDEX: 23.01 KG/M2 | WEIGHT: 134.8 LBS

## 2021-10-13 DIAGNOSIS — J30.9 ALLERGIC RHINITIS, UNSPECIFIED SEASONALITY, UNSPECIFIED TRIGGER: ICD-10-CM

## 2021-10-13 DIAGNOSIS — Z23 FLU VACCINE NEED: ICD-10-CM

## 2021-10-13 DIAGNOSIS — K21.9 GASTROESOPHAGEAL REFLUX DISEASE, UNSPECIFIED WHETHER ESOPHAGITIS PRESENT: Primary | ICD-10-CM

## 2021-10-13 PROCEDURE — 1036F TOBACCO NON-USER: CPT | Performed by: INTERNAL MEDICINE

## 2021-10-13 PROCEDURE — 90682 RIV4 VACC RECOMBINANT DNA IM: CPT | Performed by: INTERNAL MEDICINE

## 2021-10-13 PROCEDURE — 99213 OFFICE O/P EST LOW 20 MIN: CPT | Performed by: INTERNAL MEDICINE

## 2021-10-13 PROCEDURE — 90471 IMMUNIZATION ADMIN: CPT | Performed by: INTERNAL MEDICINE

## 2021-10-13 PROCEDURE — 3008F BODY MASS INDEX DOCD: CPT | Performed by: INTERNAL MEDICINE

## 2021-10-13 RX ORDER — AZELASTINE 1 MG/ML
1 SPRAY, METERED NASAL 2 TIMES DAILY
Qty: 30 ML | Refills: 0 | Status: SHIPPED | OUTPATIENT
Start: 2021-10-13

## 2021-10-15 ENCOUNTER — HOSPITAL ENCOUNTER (OUTPATIENT)
Dept: MAMMOGRAPHY | Facility: MEDICAL CENTER | Age: 41
Discharge: HOME | End: 2021-10-15
Payer: COMMERCIAL

## 2021-10-15 VITALS — HEIGHT: 64 IN | BODY MASS INDEX: 22.88 KG/M2 | WEIGHT: 134 LBS

## 2021-10-15 DIAGNOSIS — Z12.31 ENCOUNTER FOR SCREENING MAMMOGRAM FOR MALIGNANT NEOPLASM OF BREAST: ICD-10-CM

## 2021-10-15 PROCEDURE — 77067 SCR MAMMO BI INCL CAD: CPT

## 2021-10-15 PROCEDURE — 77063 BREAST TOMOSYNTHESIS BI: CPT

## 2022-09-14 DIAGNOSIS — J30.9 ALLERGIC RHINITIS, UNSPECIFIED SEASONALITY, UNSPECIFIED TRIGGER: ICD-10-CM

## 2022-09-14 PROBLEM — J30.1 SEASONAL ALLERGIC RHINITIS DUE TO POLLEN: Status: ACTIVE | Noted: 2022-09-14

## 2022-09-14 RX ORDER — AZELASTINE 1 MG/ML
1 SPRAY, METERED NASAL 2 TIMES DAILY
Qty: 30 ML | Refills: 3 | Status: SHIPPED | OUTPATIENT
Start: 2022-09-14

## 2022-10-18 ENCOUNTER — TELEMEDICINE (OUTPATIENT)
Dept: INTERNAL MEDICINE CLINIC | Facility: CLINIC | Age: 42
End: 2022-10-18
Payer: COMMERCIAL

## 2022-10-18 DIAGNOSIS — J30.9 ALLERGIC RHINITIS, UNSPECIFIED SEASONALITY, UNSPECIFIED TRIGGER: ICD-10-CM

## 2022-10-18 DIAGNOSIS — Z86.16 HISTORY OF COVID-19: ICD-10-CM

## 2022-10-18 DIAGNOSIS — Z12.31 ENCOUNTER FOR SCREENING MAMMOGRAM FOR BREAST CANCER: ICD-10-CM

## 2022-10-18 DIAGNOSIS — E55.9 VITAMIN D DEFICIENCY: Primary | ICD-10-CM

## 2022-10-18 DIAGNOSIS — K21.9 GASTROESOPHAGEAL REFLUX DISEASE, UNSPECIFIED WHETHER ESOPHAGITIS PRESENT: ICD-10-CM

## 2022-10-18 DIAGNOSIS — G44.219 EPISODIC TENSION-TYPE HEADACHE, NOT INTRACTABLE: ICD-10-CM

## 2022-10-18 PROCEDURE — 99214 OFFICE O/P EST MOD 30 MIN: CPT | Performed by: INTERNAL MEDICINE

## 2022-10-18 RX ORDER — FEXOFENADINE HCL 180 MG/1
180 TABLET ORAL DAILY
COMMUNITY

## 2022-10-18 RX ORDER — MULTIVITAMIN
1 TABLET ORAL DAILY
COMMUNITY

## 2022-10-18 NOTE — PROGRESS NOTES
Virtual Regular Visit    Verification of patient location:    Patient is located in the following state in which I hold an active license [place]      Assessment/Plan:    Recently diagnosed with COVID as below  Her symptoms are mild  She appears to be recovering  She is to remain in quarantine per the CDC guidelines  She is low risk for disease progression, not a candidate for oral antiviral therapy    Her description of headache seems to be most consistent with tension-type or cervicogenic headaches  She may continue with Advil p r n     May continue with her massage therapy as well  I suggest she try home neck exercises, topical analgesics, ice/heat, could also send muscle relaxer to the pharmacy in the future if needed  She is agreeable to plan    Will obtain screening blood work  Follow-up in office in 1 year for annual physical, sooner if needed    Problem List Items Addressed This Visit    None     Visit Diagnoses     Vitamin D deficiency    -  Primary    Relevant Orders    Vitamin D 25 hydroxy    Encounter for screening mammogram for breast cancer        Allergic rhinitis, unspecified seasonality, unspecified trigger        Gastroesophageal reflux disease, unspecified whether esophagitis present        Relevant Orders    CBC and differential    Comprehensive metabolic panel    Lipid panel    TSH, 3rd generation with Free T4 reflex    Episodic tension-type headache, not intractable        History of COVID-19              Seen today for virtual visit  Medical history of GERD, seasonal allergies    She recently returned from a trip to [facility] with her family  She and the family have tested positive for COVID  Everyone is doing well  She is minimally symptomatic at this time with nasal congestion, itchy throat, postnasal drip symptoms  Her positive test was on Parveen 10/16, symptoms started 10/15    Primary concern today is frequent headaches  These occur maybe once a week    Once a month she may get a “multi day" headache  She is not having any nausea or vomiting, no prominent photophobia or phonophobia  Headache is sometimes worsened with changing positions/altitude  The headaches are usually located on the top of her head and sometimes in the left shoulder and down into the left arm  Advil does work for a while but the effect can wear off  She has been getting massages once a month         Reason for visit is   Chief Complaint   Patient presents with   • COVID-19     COVID Follow Up   • Virtual Regular Visit        Encounter provider Silvino Howard DO    Provider located at [facility] [id number]      Recent Visits  No visits were found meeting these conditions  Showing recent visits within past 7 days and meeting all other requirements  Today's Visits  Date Type Provider Dept   10/18/22 Telemedicine Silvino Howard DO  Internal Med [place]   Showing today's visits and meeting all other requirements  Future Appointments  No visits were found meeting these conditions  Showing future appointments within next 150 days and meeting all other requirements       The patient was identified by name and date of birth  Lidia Rios was informed that this is a telemedicine visit and that the visit is being conducted through [facility] and patient was informed this is a secure, HIPAA-complaint platform  She agrees to proceed     My office door was closed  No one else was in the room  She acknowledged consent and understanding of privacy and security of the video platform  The patient has agreed to participate and understands they can discontinue the visit at any time  Patient is aware this is a billable service  Hoa Alcaraz is a 40 y o  female       HPI     Past Medical History:   Diagnosis Date   • Pneumonia     Last assessed 3/17/2016       No past surgical history on file      Current Outpatient Medications   Medication Sig Dispense Refill   • albuterol (VENTOLIN HFA) 90 mcg/act inhaler Inhale 2 puffs every 4 (four) hours as needed for wheezing 1 Inhaler 1   • azelastine (ASTELIN) 0 1 % nasal spray 1 spray into each nostril 2 (two) times a day Use in each nostril as directed 30 mL 3   • fexofenadine (ALLEGRA) 180 MG tablet Take 180 mg by mouth daily     • fluticasone (FLONASE) 50 mcg/act nasal spray 2 sprays into each nostril daily for 30 days 1 Bottle 2   • loratadine (CLARITIN) 10 mg tablet Take 10 mg by mouth daily     • Multiple Vitamin (multivitamin) tablet Take 1 tablet by mouth daily 1 tablet 3 times a week     • norethindrone-ethinyl estradiol (JUNEL FE 1/20) 1-20 MG-MCG per tablet Take 1 tablet by mouth daily       No current facility-administered medications for this visit  Allergies   Allergen Reactions   • Amoxicillin Hives       Review of Systems    Video Exam    There were no vitals filed for this visit      Physical Exam

## 2022-11-30 ENCOUNTER — HOSPITAL ENCOUNTER (OUTPATIENT)
Dept: MAMMOGRAPHY | Facility: MEDICAL CENTER | Age: 42
Discharge: HOME | End: 2022-11-30

## 2022-11-30 VITALS — HEIGHT: 64 IN | BODY MASS INDEX: 22.88 KG/M2 | WEIGHT: 134.04 LBS

## 2022-11-30 DIAGNOSIS — Z12.31 ENCOUNTER FOR SCREENING MAMMOGRAM FOR MALIGNANT NEOPLASM OF BREAST: ICD-10-CM

## 2023-01-13 ENCOUNTER — HOSPITAL ENCOUNTER (OUTPATIENT)
Dept: ULTRASOUND IMAGING | Facility: CLINIC | Age: 43
Discharge: HOME | End: 2023-01-13

## 2023-01-13 VITALS — BODY MASS INDEX: 23.74 KG/M2 | WEIGHT: 134 LBS | HEIGHT: 63 IN

## 2023-01-13 DIAGNOSIS — R92.2 BREAST DENSITY: ICD-10-CM

## 2024-01-29 ENCOUNTER — OFFICE VISIT (OUTPATIENT)
Dept: INTERNAL MEDICINE CLINIC | Facility: CLINIC | Age: 44
End: 2024-01-29
Payer: COMMERCIAL

## 2024-01-29 ENCOUNTER — TELEPHONE (OUTPATIENT)
Age: 44
End: 2024-01-29

## 2024-01-29 VITALS
SYSTOLIC BLOOD PRESSURE: 118 MMHG | DIASTOLIC BLOOD PRESSURE: 78 MMHG | BODY MASS INDEX: 24.63 KG/M2 | OXYGEN SATURATION: 99 % | HEIGHT: 63 IN | WEIGHT: 139 LBS | TEMPERATURE: 98.2 F | HEART RATE: 110 BPM

## 2024-01-29 DIAGNOSIS — R68.2 DRY MOUTH: Primary | ICD-10-CM

## 2024-01-29 DIAGNOSIS — J30.9 ALLERGIC RHINITIS, UNSPECIFIED SEASONALITY, UNSPECIFIED TRIGGER: ICD-10-CM

## 2024-01-29 DIAGNOSIS — E55.9 VITAMIN D DEFICIENCY: ICD-10-CM

## 2024-01-29 DIAGNOSIS — E78.5 DYSLIPIDEMIA: ICD-10-CM

## 2024-01-29 PROCEDURE — 99214 OFFICE O/P EST MOD 30 MIN: CPT | Performed by: INTERNAL MEDICINE

## 2024-01-29 RX ORDER — AZELASTINE HYDROCHLORIDE, FLUTICASONE PROPIONATE 137; 50 UG/1; UG/1
1 SPRAY, METERED NASAL 2 TIMES DAILY
Qty: 23 G | Refills: 0 | Status: SHIPPED | OUTPATIENT
Start: 2024-01-29

## 2024-01-29 NOTE — TELEPHONE ENCOUNTER
PA for Azelastine-Fluticasone 137-50 MCG/ACT SUSP     Submitted via  []CMM-KEY   [x]Surescripts-Case ID # [id number]  []Faxed to plan   []Other website   []Phone call Case ID #     Office notes sent, clinical questions answered. Awaiting determination

## 2024-01-29 NOTE — TELEPHONE ENCOUNTER
PA for  Azelastine-Fluticasone 137-50 MCG/ACT SUSP  Approved   Date(s) approved 12/30/23-1/28/28  Case #[id number]    Patient advised by [x] kubo financiero Message                      [] Phone call       Pharmacy advised by [x]Fax                                     []Phone call    Approval letter scanned into Media No -Did not receive yet

## 2024-01-29 NOTE — PROGRESS NOTES
INTERNAL MEDICINE OFFICE VISIT  [facility] Internal Medicine- [place]    NAME: Amie Ballesteros  AGE: 43 y.o. SEX: female    DATE OF ENCOUNTER: 1/29/2024    Assessment and Plan/History of Present Illness     Here today for follow-up  Medical history of GERD, seasonal allergies    Approximately 2-week history of dry mouth.  Patient suspects she may have had COVID infection mid December, patient's  and son tested positive with similar symptoms though her test was negative.  Since then she has had some issues with itchy throat, postnasal drip.  Initially taking Claritin-D, now taking just Flonase as she thought the Claritin may have been drying her out.  She denies any other new medications or over-the-counter prescriptions.  Denies any issues with dry eyes.  She does on occasion have issues with seasonal allergies    Plan:  -Etiology of dry mouth unclear, possibly related to allergic rhinitis/seasonal allergies or sequela of recent COVID infection  -Recommend nasal saline irrigation followed by fluticasone-azelastine nasal sprays  -Can also try Biotene mouthwash or XyliMelts if no sufficient relief               Orders Placed This Encounter   Procedures   • CBC and differential   • Comprehensive metabolic panel   • Lipid Panel with Direct LDL reflex   • Vitamin D 25 hydroxy       Chief Complaint     Chief Complaint   Patient presents with   • dry mouth     Dry mouth for a few weeks       Review of Systems     10 point ROS negative except per HPI    The following portions of the patient's history were reviewed and updated as appropriate: allergies, current medications, past family history, past medical history, past social history, past surgical history and problem list.    Active Problem List     Patient Active Problem List   Diagnosis   • Gastroesophageal reflux disease without esophagitis   • Seasonal allergic rhinitis due to pollen       Objective     /78 (BP Location: Left arm, Patient Position:  "Sitting, Cuff Size: Standard)   Pulse (!) 110   Temp 98.2 °F (36.8 °C) (Tympanic)   Ht 5' 3\" (1.6 m)   Wt 63 kg (139 lb)   SpO2 99%   BMI 24.62 kg/m²     Physical Exam    Pertinent Laboratory/Diagnostic Studies:  US breast screening bilateral complete (ABUS)    Result Date: 1/16/2023  Impression:  No evidence of malignancy. Automated breast ultrasound is an adjunct, not a replacement, for routine yearly screening mammography. ASSESSMENT/BI-RADS CATEGORY: Left: 1 - Negative Right: 1 - Negative Overall: 1 - Negative RECOMMENDATION:      - Routine screening mammogram in 1 year for both breasts. Workstation ID: [id number]      Images and diagnostics reviewed     Current Medications     Current Outpatient Medications:   •  Azelastine-Fluticasone 137-50 MCG/ACT SUSP, 1 spray into each nostril 2 (two) times a day, Disp: 23 g, Rfl: 0  •  Multiple Vitamin (multivitamin) tablet, Take 1 tablet by mouth daily 1 tablet 3 times a week, Disp: , Rfl:   •  norethindrone-ethinyl estradiol (JUNEL FE 1/20) 1-20 MG-MCG per tablet, Take 1 tablet by mouth daily, Disp: , Rfl:   •  albuterol (VENTOLIN HFA) 90 mcg/act inhaler, Inhale 2 puffs every 4 (four) hours as needed for wheezing (Patient not taking: Reported on 1/29/2024), Disp: 1 Inhaler, Rfl: 1  •  fexofenadine (ALLEGRA) 180 MG tablet, Take 180 mg by mouth daily (Patient not taking: Reported on 1/29/2024), Disp: , Rfl:   •  loratadine (CLARITIN) 10 mg tablet, Take 10 mg by mouth daily (Patient not taking: Reported on 1/29/2024), Disp: , Rfl:     Health Maintenance     Health Maintenance   Topic Date Due   • Hepatitis C Screening  Never done   • HIV Screening  Never done   • Cervical Cancer Screening  Never done   • Annual Physical  05/13/2022   • COVID-19 Vaccine (5 - 2023-24 season) 11/24/2023   • Breast Cancer Screening: Mammogram  11/30/2023   • Depression Screening  01/29/2025   • DTaP,Tdap,and Td Vaccines (2 - Td or Tdap) 05/07/2025   • Colorectal Cancer Screening  " 01/25/2027   • Zoster Vaccine (1 of 2) 07/30/2031   • Influenza Vaccine  Completed   • Pneumococcal Vaccine: Pediatrics (0 to 5 Years) and At-Risk Patients (6 to 64 Years)  Aged Out   • HIB Vaccine  Aged Out   • IPV Vaccine  Aged Out   • Hepatitis A Vaccine  Aged Out   • Meningococcal ACWY Vaccine  Aged Out   • HPV Vaccine  Aged Out     Immunization History   Administered Date(s) Administered   • COVID-19 MODERNA VACC 0.5 ML IM 12/30/2020, 01/25/2021, 09/05/2021   • COVID-19 Pfizer mRNA vacc PF emiliana-sucrose 12 yr and older (Comirnaty) 09/29/2023   • INFLUENZA 10/08/2014, 09/30/2015, 11/14/2016   • Influenza Quadrivalent Preservative Free 3 years and older IM 10/07/2014   • Influenza Quadrivalent, 6-35 Months IM 09/30/2015, 11/14/2016   • Influenza, recombinant, quadrivalent,injectable, preservative free 10/11/2018, 10/04/2019, 11/03/2020, 10/13/2021   • Influenza, seasonal, injectable 10/23/2013   • Tdap 05/07/2015       Silvino Rey D.O.  [facility] - [address] [place] [address]  Office: [phone]  Fax: [phone]

## 2024-01-30 ENCOUNTER — APPOINTMENT (OUTPATIENT)
Dept: LAB | Facility: CLINIC | Age: 44
End: 2024-01-30
Payer: COMMERCIAL

## 2024-01-30 DIAGNOSIS — E78.5 DYSLIPIDEMIA: ICD-10-CM

## 2024-01-30 DIAGNOSIS — E55.9 VITAMIN D DEFICIENCY: ICD-10-CM

## 2024-01-30 LAB
25(OH)D3 SERPL-MCNC: 32.7 NG/ML (ref 30–100)
ALBUMIN SERPL BCP-MCNC: 4.5 G/DL (ref 3.5–5)
ALP SERPL-CCNC: 43 U/L (ref 34–104)
ALT SERPL W P-5'-P-CCNC: 25 U/L (ref 7–52)
ANION GAP SERPL CALCULATED.3IONS-SCNC: 9 MMOL/L
AST SERPL W P-5'-P-CCNC: 19 U/L (ref 13–39)
BASOPHILS # BLD AUTO: 0.08 THOUSANDS/ÂΜL (ref 0–0.1)
BASOPHILS NFR BLD AUTO: 1 % (ref 0–1)
BILIRUB SERPL-MCNC: 0.65 MG/DL (ref 0.2–1)
BUN SERPL-MCNC: 12 MG/DL (ref 5–25)
CALCIUM SERPL-MCNC: 9.6 MG/DL (ref 8.4–10.2)
CHLORIDE SERPL-SCNC: 103 MMOL/L (ref 96–108)
CHOLEST SERPL-MCNC: 200 MG/DL
CO2 SERPL-SCNC: 25 MMOL/L (ref 21–32)
CREAT SERPL-MCNC: 0.65 MG/DL (ref 0.6–1.3)
EOSINOPHIL # BLD AUTO: 0.05 THOUSAND/ÂΜL (ref 0–0.61)
EOSINOPHIL NFR BLD AUTO: 1 % (ref 0–6)
ERYTHROCYTE [DISTWIDTH] IN BLOOD BY AUTOMATED COUNT: 11.9 % (ref 11.6–15.1)
GFR SERPL CREATININE-BSD FRML MDRD: 109 ML/MIN/1.73SQ M
GLUCOSE P FAST SERPL-MCNC: 85 MG/DL (ref 65–99)
HCT VFR BLD AUTO: 40.1 % (ref 34.8–46.1)
HDLC SERPL-MCNC: 83 MG/DL
HGB BLD-MCNC: 13.3 G/DL (ref 11.5–15.4)
IMM GRANULOCYTES # BLD AUTO: 0.02 THOUSAND/UL (ref 0–0.2)
IMM GRANULOCYTES NFR BLD AUTO: 0 % (ref 0–2)
LDLC SERPL CALC-MCNC: 95 MG/DL (ref 0–100)
LYMPHOCYTES # BLD AUTO: 2.34 THOUSANDS/ÂΜL (ref 0.6–4.47)
LYMPHOCYTES NFR BLD AUTO: 33 % (ref 14–44)
MCH RBC QN AUTO: 30.2 PG (ref 26.8–34.3)
MCHC RBC AUTO-ENTMCNC: 33.2 G/DL (ref 31.4–37.4)
MCV RBC AUTO: 91 FL (ref 82–98)
MONOCYTES # BLD AUTO: 0.48 THOUSAND/ÂΜL (ref 0.17–1.22)
MONOCYTES NFR BLD AUTO: 7 % (ref 4–12)
NEUTROPHILS # BLD AUTO: 4.22 THOUSANDS/ÂΜL (ref 1.85–7.62)
NEUTS SEG NFR BLD AUTO: 58 % (ref 43–75)
NRBC BLD AUTO-RTO: 0 /100 WBCS
PLATELET # BLD AUTO: 348 THOUSANDS/UL (ref 149–390)
PMV BLD AUTO: 9.2 FL (ref 8.9–12.7)
POTASSIUM SERPL-SCNC: 3.7 MMOL/L (ref 3.5–5.3)
PROT SERPL-MCNC: 7.3 G/DL (ref 6.4–8.4)
RBC # BLD AUTO: 4.4 MILLION/UL (ref 3.81–5.12)
SODIUM SERPL-SCNC: 137 MMOL/L (ref 135–147)
TRIGL SERPL-MCNC: 112 MG/DL
WBC # BLD AUTO: 7.19 THOUSAND/UL (ref 4.31–10.16)

## 2024-01-30 PROCEDURE — 36415 COLL VENOUS BLD VENIPUNCTURE: CPT

## 2024-01-30 PROCEDURE — 82306 VITAMIN D 25 HYDROXY: CPT

## 2024-01-30 PROCEDURE — 80053 COMPREHEN METABOLIC PANEL: CPT

## 2024-01-30 PROCEDURE — 80061 LIPID PANEL: CPT

## 2024-01-30 PROCEDURE — 85025 COMPLETE CBC W/AUTO DIFF WBC: CPT

## 2024-02-12 ENCOUNTER — HOSPITAL ENCOUNTER (OUTPATIENT)
Dept: MAMMOGRAPHY | Facility: MEDICAL CENTER | Age: 44
Discharge: HOME | End: 2024-02-12
Payer: COMMERCIAL

## 2024-02-12 VITALS — BODY MASS INDEX: 24.63 KG/M2 | WEIGHT: 139 LBS | HEIGHT: 63 IN

## 2024-02-12 DIAGNOSIS — Z12.31 SCREENING MAMMOGRAM, ENCOUNTER FOR: ICD-10-CM

## 2024-02-12 PROCEDURE — 77067 SCR MAMMO BI INCL CAD: CPT

## 2024-02-12 PROCEDURE — 77063 BREAST TOMOSYNTHESIS BI: CPT

## 2024-02-27 ENCOUNTER — TELEPHONE (OUTPATIENT)
Age: 44
End: 2024-02-27

## 2024-02-27 DIAGNOSIS — R68.2 DRY MOUTH: Primary | ICD-10-CM

## 2024-02-27 NOTE — TELEPHONE ENCOUNTER
Patient called in stating that she has been doing everything Dr Rey had recommended for the dry mouth but that it has not improved at all . She stated the post nasal has completely improved with the nasal spray but the mouthwash does nothing for her dry mouth . Patient is wondering if Dr Rye has any other recommendations or referrals . Please advise

## 2024-02-28 NOTE — TELEPHONE ENCOUNTER
Called spoke with patient advised her Dr. Rey suggested to see ENT and referral has been placed in her chart

## 2024-04-25 ENCOUNTER — APPOINTMENT (OUTPATIENT)
Dept: LAB | Facility: CLINIC | Age: 44
End: 2024-04-25
Payer: COMMERCIAL

## 2024-04-25 DIAGNOSIS — K11.7 XEROSTOMIA: ICD-10-CM

## 2024-04-25 LAB — CRP SERPL QL: 5.8 MG/L

## 2024-04-25 PROCEDURE — 86140 C-REACTIVE PROTEIN: CPT

## 2024-04-25 PROCEDURE — 83520 IMMUNOASSAY QUANT NOS NONAB: CPT

## 2024-04-25 PROCEDURE — 86235 NUCLEAR ANTIGEN ANTIBODY: CPT | Performed by: SPECIALIST

## 2024-04-25 PROCEDURE — 36415 COLL VENOUS BLD VENIPUNCTURE: CPT

## 2024-04-25 PROCEDURE — 86235 NUCLEAR ANTIGEN ANTIBODY: CPT

## 2024-04-25 PROCEDURE — 86431 RHEUMATOID FACTOR QUANT: CPT | Performed by: SPECIALIST

## 2024-04-25 PROCEDURE — 86225 DNA ANTIBODY NATIVE: CPT

## 2024-04-25 PROCEDURE — 86037 ANCA TITER EACH ANTIBODY: CPT

## 2024-04-25 PROCEDURE — 86430 RHEUMATOID FACTOR TEST QUAL: CPT

## 2024-04-26 LAB
ENA SS-A AB SER-ACNC: 0.2 AI (ref 0–0.9)
ENA SS-B AB SER-ACNC: <0.2 AI (ref 0–0.9)
RHEUMATOID FACT SER QL LA: NEGATIVE

## 2024-04-29 LAB
C-ANCA TITR SER IF: NORMAL TITER
MISCELLANEOUS LAB TEST RESULT: NORMAL
MYELOPEROXIDASE AB SER IA-ACNC: <0.2 UNITS (ref 0–0.9)
P-ANCA ATYPICAL TITR SER IF: NORMAL TITER
P-ANCA TITR SER IF: NORMAL TITER
PROTEINASE3 AB SER IA-ACNC: <0.2 UNITS (ref 0–0.9)

## 2024-05-07 RX ORDER — PILOCARPINE HYDROCHLORIDE 5 MG/1
5 TABLET, FILM COATED ORAL 3 TIMES DAILY PRN
Qty: 90 TABLET | Refills: 5 | Status: SHIPPED | OUTPATIENT
Start: 2024-05-07

## 2024-08-29 ENCOUNTER — HOSPITAL ENCOUNTER (OUTPATIENT)
Dept: ULTRASOUND IMAGING | Facility: CLINIC | Age: 44
Discharge: HOME | End: 2024-08-29
Payer: COMMERCIAL

## 2024-08-29 VITALS — WEIGHT: 135 LBS | HEIGHT: 63 IN | BODY MASS INDEX: 23.92 KG/M2

## 2024-08-29 DIAGNOSIS — R92.333 HETEROGENEOUSLY DENSE TISSUE OF BOTH BREASTS ON MAMMOGRAPHY: ICD-10-CM

## 2024-08-29 PROCEDURE — 76641 ULTRASOUND BREAST COMPLETE: CPT

## 2024-09-06 ENCOUNTER — HOSPITAL ENCOUNTER (OUTPATIENT)
Dept: ULTRASOUND IMAGING | Facility: CLINIC | Age: 44
Discharge: HOME | End: 2024-09-06
Payer: COMMERCIAL

## 2024-09-06 DIAGNOSIS — R93.89 ABNORMAL ULTRASOUND: ICD-10-CM

## 2024-09-06 PROCEDURE — 76642 ULTRASOUND BREAST LIMITED: CPT

## 2024-11-08 ENCOUNTER — OFFICE VISIT (OUTPATIENT)
Dept: PHYSICAL THERAPY | Facility: MEDICAL CENTER | Age: 44
End: 2024-11-08
Payer: COMMERCIAL

## 2024-11-08 DIAGNOSIS — M25.512 ACUTE PAIN OF LEFT SHOULDER: Primary | ICD-10-CM

## 2024-11-08 PROCEDURE — 97161 PT EVAL LOW COMPLEX 20 MIN: CPT | Performed by: PHYSICAL THERAPIST

## 2024-11-08 NOTE — PROGRESS NOTES
PT Evaluation     Today's date: 2024  Patient name: Amie Ballesteros  : 1980  MRN: [id number]  Referring provider: Fernanda Yanes, SINAN  Dx:   Encounter Diagnosis     ICD-10-CM    1. Acute pain of left shoulder  M25.512                      Assessment  Impairments: abnormal muscle firing, abnormal muscle tone, abnormal or restricted ROM, activity intolerance, impaired physical strength, lacks appropriate home exercise program, pain with function, participation limitations and activity limitations  Functional limitations: reaching, donning/doffing clothing, cooking, gym routine  Symptom irritability: low    Assessment details: Amie Ballesteros is a pleasant 44 y.o. female who presents with acute L lateral shoulder pain for the last 2 weeks. No further referral is necessary at this time based upon examination results.    Primary movement impairment is weakness of her L scapular stabilizers, which contributes to compensatory stress on her L shoulder girdle, and limits her ability to reach, cook, and participate in strength training routine. Patient also presents with mild L GH posterior capsule tightness, which limits her ability to don/doff clothing. Patient responded well to gentle GH mobs for pain relief with report of decreased intensity of end-range pain during elevation post-tx. Patient was educated in an illustrated HEP for shoulder mobility and scapular stability and was educated to perform exercises in a pain-free range. Patient would benefit from skilled PT services to address the listed impairments to facilitate a return to PLOF.   Barriers to therapy: none  Understanding of Dx/Px/POC: excellent     Prognosis: excellent  Prognosis details: Positive prognostic factors include positive attitude towards recovery. No negative prognostic factors.     Goals  STG:  Patient will be independent with home exercise program.   Patient will be able to perform a proper scapular retraction to reduce stress on  L shoulder during ADL.  LTG:  Patient will increase L shoulder FIR AROM BTB to be comparable to the contralateral side to demonstrate improved mobility for donning/doffing clothing.  Patient will increase strength of L middle and lower trapezius to at least 4+/5 to be able to participate in exercise routine.  Patient will be able to cook without pain.  Patient will be able to return to strength training with modifications if necessary.  Patient will be able to manage symptoms independently.     Plan  Patient would benefit from: skilled physical therapy  Referral necessary: No  Planned modality interventions: cryotherapy and thermotherapy: hydrocollator packs    Planned therapy interventions: abdominal trunk stabilization, activity modification, IASTM, joint mobilization, kinesiology taping, manual therapy, massage, Garcia taping, motor coordination training, body mechanics training, neuromuscular re-education, patient/caregiver education, postural training, strengthening, stretching, therapeutic activities, therapeutic exercise, functional ROM exercises, flexibility, graded activity, graded exercise and home exercise program    Frequency: 2x week (tapering to 1x/week)  Plan of Care beginning date: 11/8/2024  Plan of Care expiration date: 12/6/2024  Treatment plan discussed with: patient  Plan details: Prognosis is above given PT services 2x/week tapering to 1x/week for 4 additional weeks and given HEP adherence.    Subjective Evaluation    History of Present Illness  Mechanism of injury: This is a 44 y.o. female presenting with L shoulder pain for the last 2 weeks. She was doing some strength training in the gym at the time, but she did not sustain any acute injury. She notes that the pain started after performing yoga a few days after the strength session. The pain is located in the outside of her L shoulder and radiates into her L upper arm. No radiating pain beyond the elbow and no numbness/tingling. 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 She has a history of chronic tightness in her shoulders and neck for the last 10 years but no history of shoulder injuries.             Not a recurrent problem   Quality of life: excellent    Patient Goals  Patient goals for therapy: decreased pain, return to sport/leisure activities, independence with ADLs/IADLs and increased motion  Patient goal: to be able to reach, to be able to don/doff clothing, to be able to cook, to be able to return to gym routine  Pain  Current pain rating: 3  At best pain rating: 3  At worst pain ratin  Location: L lateral shoulder/L upper arm  Quality: throbbing and dull ache  Relieving factors: medications (Advil)  Exacerbated by: reaching across body, reaching out to the side, donning/doffing clothing, gym routine, cooking.      Diagnostic Tests  No diagnostic tests performed  Treatments  Previous treatment: massage      Objective     Tenderness     Additional Tenderness Details  (+) gross TTP L superolateral shoulder    Active Range of Motion   Cervical/Thoracic Spine       Cervical    Flexion:  WFL  Extension:  WFL  Left rotation:  WFL  Right rotation:  WFL  Left Shoulder   Flexion: 180 (end-range pain) degrees with pain  Abduction: 180 (end-range pain) degrees with pain  External rotation 0°: 75 degrees   External rotation BTH: T1 with pain  Internal rotation BTB: T9 with pain    Right Shoulder   Flexion: 180 degrees   Abduction: 180 degrees   External rotation 0°: 75 degrees   External rotation BTH: T1   Internal rotation BTB: T7     Additional Active Range of Motion Details  Seated cervical FLX/EXT AROM repeated motion testing unremarkable    Passive Range of Motion   Left Shoulder   Flexion: 175 (end-range pain) degrees with pain  Abduction: 175 (end-range pain) degrees with pain  External rotation 0°: 90 degrees   External rotation 90°: 90 degrees   Internal  "rotation 90°: 60 degrees with pain    Right Shoulder   Flexion: 180 degrees   Abduction: 180 degrees   External rotation 0°: 90 degrees   External rotation 90°: 90 degrees   Internal rotation 90°: 75 degrees     Joint Play   Left Shoulder  Joints within functional limits are the inferior capsule. Hypomobile in the posterior capsule.    Right Shoulder  Joints within functional limits are the posterior capsule and inferior capsule.     Strength/Myotome Testing     Left Shoulder     Planes of Motion   Flexion: 5 (pain)   Abduction: 5   External rotation at 0°: 4+ (pain)   Internal rotation at 0°: 5     Isolated Muscles   Lower trapezius: 3+   Middle trapezius: 3+ (pain)     Right Shoulder     Planes of Motion   Flexion: 5   Abduction: 5   External rotation at 0°: 5   Internal rotation at 0°: 5     Isolated Muscles   Lower trapezius: 4   Middle trapezius: 4     Tests     Left Shoulder   Positive scapular relocation  (for improved strength) and scapular assistance test positive (for decreased pain).            Precautions: none    HEP: table slides (FLX), scapular retractions  Manuals 11/8            L GH posterior glides KP Gr. I-II            L GH inferior glides KP Gr. I-II            L GH IR PROM NV             x5'            Neuro Re-Ed                                                                                                        Ther Ex             Pulleys NV            Supine self-shoulder FLX AAROM             Prone scapular retraction NV            Prone shoulder extension NV            Table slides 5\"x20 FLX HEP            Pball rolls on plinth NV            Scapular retractions 3\"x20 HEP            Scapular row NV            Scapular ext NV            Robberies             Lawn mowers             Wall ball circles                          Ther Activity                                       Gait Training                                       Modalities             MHP L shoulder X10'                     "

## 2024-11-12 ENCOUNTER — OFFICE VISIT (OUTPATIENT)
Dept: PHYSICAL THERAPY | Facility: MEDICAL CENTER | Age: 44
End: 2024-11-12
Payer: COMMERCIAL

## 2024-11-12 DIAGNOSIS — M25.512 ACUTE PAIN OF LEFT SHOULDER: Primary | ICD-10-CM

## 2024-11-12 PROCEDURE — 97110 THERAPEUTIC EXERCISES: CPT | Performed by: PHYSICAL THERAPIST

## 2024-11-12 PROCEDURE — 97140 MANUAL THERAPY 1/> REGIONS: CPT | Performed by: PHYSICAL THERAPIST

## 2024-11-12 NOTE — PROGRESS NOTES
"Daily Note     Today's date: 2024  Patient name: Amie Ballesteros  : 1980  MRN: [id number]  Referring provider: Fernanda Yanes, PT  Dx:   Encounter Diagnosis     ICD-10-CM    1. Acute pain of left shoulder  M25.512                      Subjective: Patient reports that her shoulder is feeling improved with less pain. She has been having some discomfort on the inside of her R wrist after playing the harp.      Objective: See treatment diary below      Assessment: Performed gentle L GH mobs for pain relief, and patient responded well to manual therapy with report of alleviation of symptoms post-tx. IR PROM also improved after manual interventions. Initiated shoulder mobility and scapular stability program as outlined below. Tactile cueing provided to prevent compensation from UT during prone retraction. Performed prone scapular activation interventions bilaterally to improve bilateral postural control and reduce stress on L shoulder during ADL. Patient tolerated treatment well and completed program without pain. Patient would benefit from continued PT to address impairments to maximize function.      Plan: Continue per plan of care.      Precautions: none    HEP: table slides (FLX), scapular retractions  Manuals            L GH posterior glides KP Gr. I-II KP Gr. I-II           L GH inferior glides KP Gr. I-II KP Gr. I-II           L GH IR PROM NV KP            x5' x15'           Neuro Re-Ed                                                                                                        Ther Ex             Pulleys NV 5'           Supine self-shoulder FLX AAROM  5\"x10           Prone scapular retraction NV 3x5 b/l           Prone shoulder extension NV 3x5 b/l           Table slides 5\"x20 FLX HEP 5\"x20 FLX           Pball rolls on plinth NV 5\"x20 fwd           Scapular retractions 3\"x20 HEP 3\" 2x10                        Ther Activity                                       Gait Training     "                                   Modalities             MHP L shoulder X10'  X10' post tx

## 2024-11-15 ENCOUNTER — OFFICE VISIT (OUTPATIENT)
Dept: PHYSICAL THERAPY | Facility: MEDICAL CENTER | Age: 44
End: 2024-11-15
Payer: COMMERCIAL

## 2024-11-15 DIAGNOSIS — M25.512 ACUTE PAIN OF LEFT SHOULDER: Primary | ICD-10-CM

## 2024-11-15 PROCEDURE — 97110 THERAPEUTIC EXERCISES: CPT | Performed by: PHYSICAL THERAPIST

## 2024-11-15 PROCEDURE — 97140 MANUAL THERAPY 1/> REGIONS: CPT | Performed by: PHYSICAL THERAPIST

## 2024-11-15 NOTE — PROGRESS NOTES
"Daily Note     Today's date: 11/15/2024  Patient name: Amie Ballesteros  : 1980  MRN: [id number]  Referring provider: Fernanda Yanes, PT  Dx:   Encounter Diagnosis     ICD-10-CM    1. Acute pain of left shoulder  M25.512                      Subjective: Patient reports that she has some mild soreness after trying on clothes when shopping yesterday, but her shoulder is feeling a little better overall.      Objective: See treatment diary below      Assessment: Continued with gentle GH mobs for pain relief, and patient tolerated manual therapy well. She demonstrates very mild limitation in L shoulder FIR AROM BTB, but this is steadily improving each session. She also demonstrated improvements in L shoulder IR PROM post-tx compared to presentation. Continued to perform prone scapular activation interventions bilaterally to improve postural control and reduce compensatory stress on L shoulder during ADL. Added scaption table slides to improve multiplanar mobility. Patient tolerated treatment well and completed program without pain. Patient would benefit from continued PT to further improve mobility, postural control, and scapular stability to achieve goals.      Plan: Continue per plan of care.      Precautions: none    HEP: table slides (FLX), scapular retractions  Manuals 11/8 11/12 11/15          L GH posterior glides KP Gr. I-II KP Gr. I-II KP Gr. I-II          L GH inferior glides KP Gr. I-II KP Gr. I-II KP Gr. I-II          L GH IR PROM NV KP KP           x5' x15' x15'          Neuro Re-Ed                                                                                                        Ther Ex             Pulleys NV 5' 6'          Supine self-shoulder FLX AAROM  5\"x10 5\"x10          Supine serratus punches   2x10          Prone scapular retraction NV 3x5 b/l 3x5 b/l          Prone shoulder extension NV 3x5 b/l 3x5 b/l          Table slides 5\"x20 FLX HEP 5\"x20 FLX 5\"x20 FLX/  5\"x20 scapt        " "  Pball rolls on plinth NV 5\"x20 fwd 5\"x20 fwd          Scapular retractions 3\"x20 HEP 3\" 2x10 3\"x20                       Ther Activity                                       Gait Training                                       Modalities             MHP L shoulder X10'  X10' post tx Pt deferred                              "

## 2024-11-18 ENCOUNTER — OFFICE VISIT (OUTPATIENT)
Dept: PHYSICAL THERAPY | Facility: MEDICAL CENTER | Age: 44
End: 2024-11-18
Payer: COMMERCIAL

## 2024-11-18 DIAGNOSIS — M25.512 ACUTE PAIN OF LEFT SHOULDER: Primary | ICD-10-CM

## 2024-11-18 PROCEDURE — 97110 THERAPEUTIC EXERCISES: CPT | Performed by: PHYSICAL THERAPIST

## 2024-11-18 PROCEDURE — 97140 MANUAL THERAPY 1/> REGIONS: CPT | Performed by: PHYSICAL THERAPIST

## 2024-11-18 NOTE — PROGRESS NOTES
Daily Note     Today's date: 2024  Patient name: Amie Ballesteros  : 1980  MRN: [id number]  Referring provider: Fernanda Yanes, PT  Dx:   Encounter Diagnosis     ICD-10-CM    1. Acute pain of left shoulder  M25.512                      Subjective: Patient reports that her shoulder is continuing to feel better with less pain.      Objective: See treatment diary below      Assessment: Continued with gentle GH mobs for pain relief, and patient tolerated manual therapy well. L shoulder FIR AROM BTB is steadily improving each session, and she demonstrated improvements in L shoulder IR PROM after manual therapy. Continued to perform prone scapular strengthening bilaterally to improve bilateral postural control and to reduce compensatory stress on L shoulder during ADL. Able to increase reps for prone scapular strengthening, which demonstrates an improvement in proximal endurance. Patient tolerated treatment well and completed program without pain. Due to good progress, plan to reduce frequency to 1x/week. Patient would benefit from continued PT to further progress treatment to achieve goals.    Goals  STG:  Patient will be independent with home exercise program.- met   Patient will be able to perform a proper scapular retraction to reduce stress on L shoulder during ADL.- met  LTG:  Patient will increase L shoulder FIR AROM BTB to be comparable to the contralateral side to demonstrate improved mobility for donning/doffing clothing.- in progress (improved)  Patient will increase strength of L middle and lower trapezius to at least 4+/5 to be able to participate in exercise routine.- in progress (improved)  Patient will be able to cook without pain.- in progress  Patient will be able to return to strength training with modifications if necessary.- in progress  Patient will be able to manage symptoms independently.- in progress     Plan: Continue per plan of care. Due to good progress, plan to reduce  "frequency to 1x/week.     Precautions: none    HEP: table slides (FLX), scapular retractions  Manuals 11/8 11/12 11/15 11/18         L GH posterior glides KP Gr. I-II KP Gr. I-II KP Gr. I-II KP Gr. I-II         L GH inferior glides KP Gr. I-II KP Gr. I-II KP Gr. I-II KP Gr. I-II         L GH IR PROM NV KP KP KP          x5' x15' x15' x15'         Neuro Re-Ed                                                                                                        Ther Ex             Pulleys NV 5' 6' 6'         Supine self-shoulder FLX AAROM  5\"x10 5\"x10 5\"x15         Supine serratus punches   2x10 3x10         Prone scapular retraction NV 3x5 b/l 3x5 b/l 4x5 b/l         Prone shoulder extension NV 3x5 b/l 3x5 b/l 4x5 b/l         Table slides 5\"x20 FLX HEP 5\"x20 FLX 5\"x20 FLX/  5\"x20 scapt 5\"x20 FLX/5\"x20 scapt         Pball rolls on plinth NV 5\"x20 fwd 5\"x20 fwd 5\"x20 fwd         Scapular retractions 3\"x20 HEP 3\" 2x10 3\"x20 3\"x20                      Ther Activity                                       Gait Training                                       Modalities             MHP L shoulder X10'  X10' post tx Pt deferred At home                               "

## 2024-11-21 ENCOUNTER — APPOINTMENT (OUTPATIENT)
Dept: PHYSICAL THERAPY | Facility: MEDICAL CENTER | Age: 44
End: 2024-11-21
Payer: COMMERCIAL

## 2024-11-22 ENCOUNTER — APPOINTMENT (OUTPATIENT)
Dept: PHYSICAL THERAPY | Facility: MEDICAL CENTER | Age: 44
End: 2024-11-22
Payer: COMMERCIAL

## 2024-11-25 ENCOUNTER — APPOINTMENT (OUTPATIENT)
Dept: PHYSICAL THERAPY | Facility: MEDICAL CENTER | Age: 44
End: 2024-11-25
Payer: COMMERCIAL

## 2024-11-27 ENCOUNTER — OFFICE VISIT (OUTPATIENT)
Dept: INTERNAL MEDICINE CLINIC | Facility: CLINIC | Age: 44
End: 2024-11-27
Payer: COMMERCIAL

## 2024-11-27 VITALS
WEIGHT: 136 LBS | DIASTOLIC BLOOD PRESSURE: 70 MMHG | RESPIRATION RATE: 14 BRPM | TEMPERATURE: 98.1 F | HEART RATE: 80 BPM | BODY MASS INDEX: 24.1 KG/M2 | SYSTOLIC BLOOD PRESSURE: 120 MMHG | HEIGHT: 63 IN

## 2024-11-27 DIAGNOSIS — J20.9 ACUTE BRONCHITIS, UNSPECIFIED ORGANISM: Primary | ICD-10-CM

## 2024-11-27 PROCEDURE — 99213 OFFICE O/P EST LOW 20 MIN: CPT | Performed by: INTERNAL MEDICINE

## 2024-11-27 RX ORDER — AZITHROMYCIN 250 MG/1
TABLET, FILM COATED ORAL
Qty: 6 TABLET | Refills: 0 | Status: SHIPPED | OUTPATIENT
Start: 2024-11-27 | End: 2024-12-02

## 2024-11-27 NOTE — PROGRESS NOTES
"Name: Amie Ballesteros      : 1980      MRN: [id number]  Encounter Provider: Silvino Rey DO  Encounter Date: 2024   [facility]  :  Medical history of GERD, seasonal allergies    Here today for sick visit     Assessment & Plan  Acute bronchitis, unspecified organism  Onset of symptoms this prior  night/Monday -approximately 2 to 3 days ago.  States her son was recently diagnosed with walking pneumonia and was given prescription for Z-Anjum.  She has had cough intermittently productive of small amount of phlegm.  Afebrile.  Has felt a little winded with activities around the house    Her exam is unremarkable today.  Lungs clear to auscultation.  Afebrile, hemodynamically stable    Plan:  -Suspect possible acute bronchitis.  Givenrecent exposure to son who was diagnosed with pneumonia, will also treat with course of azithromycin  -Continue supportive care otherwise -over-the-counter cough medicine, rest, hydration, lozenges, warm liquids, etc.  -Advised her to let me know if symptoms do not improve    Orders:    azithromycin (Zithromax) 250 mg tablet; Take 2 tablets (500 mg total) by mouth daily for 1 day, THEN 1 tablet (250 mg total) daily for 4 days.           History of Present Illness     HPI  Review of Systems       Objective   Ht 5' 3\" (1.6 m)   BMI 23.91 kg/m²      Physical Exam  HENT:      Right Ear: Tympanic membrane normal. There is no impacted cerumen.      Left Ear: Tympanic membrane normal. There is no impacted cerumen.      Mouth/Throat:      Mouth: Mucous membranes are moist.      Pharynx: No oropharyngeal exudate.   Cardiovascular:      Rate and Rhythm: Normal rate and regular rhythm.      Heart sounds: Normal heart sounds. No murmur heard.  Pulmonary:      Effort: Pulmonary effort is normal.      Breath sounds: Normal breath sounds. No wheezing, rhonchi or rales.         "

## 2024-11-29 ENCOUNTER — OFFICE VISIT (OUTPATIENT)
Dept: PHYSICAL THERAPY | Facility: MEDICAL CENTER | Age: 44
End: 2024-11-29
Payer: COMMERCIAL

## 2024-11-29 DIAGNOSIS — M25.512 ACUTE PAIN OF LEFT SHOULDER: Primary | ICD-10-CM

## 2024-11-29 PROCEDURE — 97110 THERAPEUTIC EXERCISES: CPT | Performed by: PHYSICAL THERAPIST

## 2024-11-29 PROCEDURE — 97140 MANUAL THERAPY 1/> REGIONS: CPT | Performed by: PHYSICAL THERAPIST

## 2024-11-29 NOTE — PROGRESS NOTES
"Daily Note     Today's date: 2024  Patient name: Amie Ballesteros  : 1980  MRN: [id number]  Referring provider: Fernanda Yanes, PT  Dx:   Encounter Diagnosis     ICD-10-CM    1. Acute pain of left shoulder  M25.512                      Subjective: Patient reports that she has some discomfort in her shoulder if she moves her arm too quickly when donning/doffing clothing, but her shoulder is feeling much better overall. She also reports that the pain in her R wrist has resolved.      Objective: See treatment diary below      Assessment: Continued with manual interventions for pain relief and to address very mild remaining limitation in end-range L shoulder IR PROM. Patient responded well to manual therapy with an improvement in IR PROM post-tx. Performed prone retraction/extension bilaterally to improve bilateral postural control and to reduce compensatory stress on L shoulder. Able to increase reps for prone retraction/extension, which demonstrates an improvement in proximal endurance. Also able to initiate standing scapular strengthening, which further demonstrates good progress toward goals. Patient tolerated treatment well and completed program without pain. Patient would benefit from continued PT to further progress treatment as appropriate to achieve goals.      Plan: Continue per plan of care.      Precautions: none    HEP: table slides (FLX), scapular retractions  Manuals 11/8 11/12 11/15 11/18 11/29        L GH posterior glides KP Gr. I-II KP Gr. I-II KP Gr. I-II KP Gr. I-II KP Gr. I-II        L GH inferior glides KP Gr. I-II KP Gr. I-II KP Gr. I-II KP Gr. I-II KP Gr. I-II        L GH IR PROM NV KP KP KP KP         x5' x15' x15' x15' x10'        Neuro Re-Ed                                                                                                        Ther Ex             Pulleys NV 5' 6' 6' 6'        Supine self-shoulder FLX AAROM  5\"x10 5\"x10 5\"x15 5\"x20        Supine serratus punches  " " 2x10 3x10 3x12        Prone scapular retraction NV 3x5 b/l 3x5 b/l 4x5 b/l 5x5 b/l        Prone shoulder extension NV 3x5 b/l 3x5 b/l 4x5 b/l 5x5 b/l        Table slides 5\"x20 FLX HEP 5\"x20 FLX 5\"x20 FLX/  5\"x20 scapt 5\"x20 FLX/5\"x20 scapt 2x10 FLX incline/2x10 scapt incline        Pball rolls on plinth NV 5\"x20 fwd 5\"x20 fwd 5\"x20 fwd         Scapular retractions 3\"x20 HEP 3\" 2x10 3\"x20 3\"x20         Scapular rows     2x10 YTB        Scapular ext     2x10 YTB        Robberies     2x10 YTB        Lawn mowers     2x10 YTB        Ther Activity                                       Gait Training                                       Modalities             MHP L shoulder X10'  X10' post tx Pt deferred At home X10' post tx                                "

## 2024-12-02 ENCOUNTER — OFFICE VISIT (OUTPATIENT)
Dept: PHYSICAL THERAPY | Facility: MEDICAL CENTER | Age: 44
End: 2024-12-02
Payer: COMMERCIAL

## 2024-12-02 DIAGNOSIS — M25.512 ACUTE PAIN OF LEFT SHOULDER: Primary | ICD-10-CM

## 2024-12-02 PROCEDURE — 97110 THERAPEUTIC EXERCISES: CPT | Performed by: PHYSICAL THERAPIST

## 2024-12-02 PROCEDURE — 97140 MANUAL THERAPY 1/> REGIONS: CPT | Performed by: PHYSICAL THERAPIST

## 2024-12-02 NOTE — PROGRESS NOTES
Daily Note     Today's date: 2024  Patient name: Amie Ballesteros  : 1980  MRN: [id number]  Referring provider: Fernanda Yanes, PT  Dx:   Encounter Diagnosis     ICD-10-CM    1. Acute pain of left shoulder  M25.512                      Subjective: Patient reports that she had some soreness in the sides of her neck/top of her shoulders after last session, but this resolved by the following day. She reports that her shoulder is feeling much improved overall, and she is not having any sharp pain when donning/doffing clothing.      Objective: See treatment diary below      Assessment: L shoulder AROM nearly WFL and comparable to the contralateral side in all planes today. Continued with gentle L GH mobs for pain relief, and patient tolerated manual therapy well. L shoulder IR PROM also improved after manual therapy. Continued to perform prone scapular strengthening bilaterally to improve bilateral postural control and to reduce compensatory stress on L shoulder. Held progressions due to report of soreness after last session. Patient was educated to add 4-way standing scapular strengthening to HEP to further improve proximal strength. Patient tolerated treatment well and completed program without pain. Patient would benefit from continued PT to maximize independence with HEP prior to upcoming d/c.      Plan: Continue per plan of care. Potential d/c next visit.     Precautions: none    HEP: table slides (FLX), scapular retractions, scap 4 (YTB)  Manuals 11/8 11/12 11/15 11/18 11/29 12/2       L GH posterior glides KP Gr. I-II KP Gr. I-II KP Gr. I-II KP Gr. I-II KP Gr. I-II KP Gr. I-II       L GH inferior glides KP Gr. I-II KP Gr. I-II KP Gr. I-II KP Gr. I-II KP Gr. I-II KP Gr. I-II       L GH IR PROM NV KP KP KP KP KP        x5' x15' x15' x15' x10' x10'       Neuro Re-Ed                                                                                                        Ther Ex             Pulleys NV 5'  "6' 6' 6' 6'       Supine self-shoulder FLX AAROM  5\"x10 5\"x10 5\"x15 5\"x20 5\"x20       Supine serratus punches   2x10 3x10 3x12 3x12       Prone scapular retraction NV 3x5 b/l 3x5 b/l 4x5 b/l 5x5 b/l 5x5 b/l       Prone shoulder extension NV 3x5 b/l 3x5 b/l 4x5 b/l 5x5 b/l 5x5 b/l       Table slides 5\"x20 FLX HEP 5\"x20 FLX 5\"x20 FLX/  5\"x20 scapt 5\"x20 FLX/5\"x20 scapt 2x10 FLX incline/2x10 scapt incline 2x10 FLX incline/2x10 scapt incline       Pball rolls on plinth NV 5\"x20 fwd 5\"x20 fwd 5\"x20 fwd         Scapular retractions 3\"x20 HEP 3\" 2x10 3\"x20 3\"x20         Scapular rows     2x10 YTB 2x10 YTB       Scapular ext     2x10 YTB 2x10 YTB       Robberies     2x10 YTB 2x10 YTB       Lawn mowers     2x10 YTB 2x10 YTB       Ther Activity                                       Gait Training                                       Modalities             MHP L shoulder X10'  X10' post tx Pt deferred At home X10' post tx X10' post tx                                 "

## 2024-12-05 DIAGNOSIS — J20.8 ACUTE BRONCHITIS DUE TO OTHER SPECIFIED ORGANISMS: ICD-10-CM

## 2024-12-05 NOTE — TELEPHONE ENCOUNTER
Patient call stating that she completed the antibiotic for pneumonia.  She feels like she is getting better but she is still having a cough and also is having wheezing in the mornings and evenings.  She is asking if her Ventolin inhaler can please be refilled and also asking if Dr. Rey would recommend anything else?  Possibly a steroid inhaler?  Please follow up with patient to advise.    Pharmacy- [facility] [id number]

## 2024-12-06 ENCOUNTER — EVALUATION (OUTPATIENT)
Dept: PHYSICAL THERAPY | Facility: MEDICAL CENTER | Age: 44
End: 2024-12-06
Payer: COMMERCIAL

## 2024-12-06 DIAGNOSIS — M25.512 ACUTE PAIN OF LEFT SHOULDER: Primary | ICD-10-CM

## 2024-12-06 PROCEDURE — 97140 MANUAL THERAPY 1/> REGIONS: CPT | Performed by: PHYSICAL THERAPIST

## 2024-12-06 PROCEDURE — 97110 THERAPEUTIC EXERCISES: CPT | Performed by: PHYSICAL THERAPIST

## 2024-12-06 RX ORDER — ALBUTEROL SULFATE 90 UG/1
2 INHALANT RESPIRATORY (INHALATION) EVERY 6 HOURS PRN
Qty: 18 G | Refills: 0 | Status: SHIPPED | OUTPATIENT
Start: 2024-12-06

## 2024-12-06 RX ORDER — BECLOMETHASONE DIPROPIONATE HFA 40 UG/1
2 AEROSOL, METERED RESPIRATORY (INHALATION) 2 TIMES DAILY
Qty: 10.6 G | Refills: 0 | Status: SHIPPED | OUTPATIENT
Start: 2024-12-06

## 2024-12-06 NOTE — TELEPHONE ENCOUNTER
Refill for Ventolin inhaler sent to pharmacy.  Also sent prescription for steroid inhaler (Qvar) to pharmacy

## 2024-12-06 NOTE — PROGRESS NOTES
Discharge Summary    Today's date: 2024  Patient name: Amie Ballesteros  : 1980  MRN: [id number]  Referring provider: Fernanda Yanes, PT  Dx:   Encounter Diagnosis     ICD-10-CM    1. Acute pain of left shoulder  M25.512                      Subjective: Patient reports that her L shoulder is feeling significantly improved. She is able to don/doff clothing and cook without pain. She did not attempt returning to her strengthening routine yet, but she is very pleased with her overall progress. She has no remaining functional limitations, and she feels ready to be discharged to her HEP.      Objective: See treatment diary below    L shoulder AROM:  WFL and symmetrical to the contralateral side in all planes    Scapular strength:   Middle trapezius strength: 4+/5 bilaterally  Lower trapezius strength: 4+/5 bilaterally      Assessment: Patient has demonstrated excellent progress with improving L shoulder FIR AROM BTB and IR PROM since her initial visit, which has restored her ability to reach when donning/doffing clothing. In addition, patient has demonstrated good progress with improving her L scapular strength, which has reduced the compensatory stress on her L shoulder when lifting objects during ADL. Patient has met all of her goals for PT except for returning to her strengthening routine. However, she is independent in an HEP for continued shoulder mobility and scapular stability. Patient tolerated all treatment well today and completed program without pain. Patient is agreeable to be discharged to her HEP.    Goals  STG:  Patient will be independent with home exercise program.- met   Patient will be able to perform a proper scapular retraction to reduce stress on L shoulder during ADL.- met  LTG:  Patient will increase L shoulder FIR AROM BTB to be comparable to the contralateral side to demonstrate improved mobility for donning/doffing clothing.- met  Patient will increase strength of L middle and  "lower trapezius to at least 4+/5 to be able to participate in exercise routine.- met  Patient will be able to cook without pain.- met  Patient will be able to return to strength training with modifications if necessary.- not met (pt did not attempt; pt is independent in an illustrated HEP for continued shoulder mobility/scapular strengthening)  Patient will be able to manage symptoms independently.- met     Plan: Discharge to HEP.     Precautions: none    HEP: table slides (FLX), scapular retractions, scap 4 (YTB)  Manuals 11/8 11/12 11/15 11/18 11/29 12/2 12/6      L GH posterior glides KP Gr. I-II KP Gr. I-II KP Gr. I-II KP Gr. I-II KP Gr. I-II KP Gr. I-II KP Gr. I-II      L GH inferior glides KP Gr. I-II KP Gr. I-II KP Gr. I-II KP Gr. I-II KP Gr. I-II KP Gr. I-II KP Gr. I-II      L GH IR PROM NV KP KP KP KP KP KP       x5' x15' x15' x15' x10' x10' x10'      Neuro Re-Ed                                                                                                        Ther Ex             Pulleys NV 5' 6' 6' 6' 6' 6'      Supine self-shoulder FLX AAROM  5\"x10 5\"x10 5\"x15 5\"x20 5\"x20 5\"x20      Supine serratus punches   2x10 3x10 3x12 3x12 3x12      Prone scapular retraction NV 3x5 b/l 3x5 b/l 4x5 b/l 5x5 b/l 5x5 b/l 5/5 b/l      Prone shoulder extension NV 3x5 b/l 3x5 b/l 4x5 b/l 5x5 b/l 5x5 b/l 5/5 b/l      Table slides 5\"x20 FLX HEP 5\"x20 FLX 5\"x20 FLX/  5\"x20 scapt 5\"x20 FLX/5\"x20 scapt 2x10 FLX incline/2x10 scapt incline 2x10 FLX incline/2x10 scapt incline 2x10 FLX incline/2x10 scapt incline      Pball rolls on plinth NV 5\"x20 fwd 5\"x20 fwd 5\"x20 fwd         Scapular retractions 3\"x20 HEP 3\" 2x10 3\"x20 3\"x20         Scapular rows     2x10 YTB 2x10 YTB 2x10 YTB      Scapular ext     2x10 YTB 2x10 YTB 2x10 YTB      Robberies     2x10 YTB 2x10 YTB 2x10 YTB      Lawn mowers     2x10 YTB 2x10 YTB 2x10 YTB      Ther Activity                                       Gait Training                                     "   Modalities             MHP L shoulder X10'  X10' post tx Pt deferred At home X10' post tx X10' post tx X10' post tx

## 2025-01-09 ENCOUNTER — PATIENT MESSAGE (OUTPATIENT)
Dept: INTERNAL MEDICINE CLINIC | Facility: CLINIC | Age: 45
End: 2025-01-09

## 2025-01-09 DIAGNOSIS — R11.0 NAUSEA: Primary | ICD-10-CM

## 2025-01-10 RX ORDER — ONDANSETRON 4 MG/1
4 TABLET, FILM COATED ORAL EVERY 8 HOURS PRN
Qty: 20 TABLET | Refills: 0 | Status: SHIPPED | OUTPATIENT
Start: 2025-01-10

## 2025-02-18 ENCOUNTER — TELEPHONE (OUTPATIENT)
Age: 45
End: 2025-02-18

## 2025-02-18 DIAGNOSIS — J10.1 INFLUENZA A: Primary | ICD-10-CM

## 2025-02-18 RX ORDER — OSELTAMIVIR PHOSPHATE 75 MG/1
75 CAPSULE ORAL EVERY 12 HOURS SCHEDULED
Qty: 10 CAPSULE | Refills: 0 | Status: SHIPPED | OUTPATIENT
Start: 2025-02-18 | End: 2025-02-23

## 2025-02-18 NOTE — TELEPHONE ENCOUNTER
Patient took home test for flu and it is pos for Flu A. She states her symptoms started yesterday and is asking for tamilu be sent for her. She would also like to know how long she is considered contagious.

## 2025-02-18 NOTE — TELEPHONE ENCOUNTER
Prescription for Tamiflu sent to pharmacy.  People are generally most contagious within the first 3 days or so of symptom onset.  At the very least need to be fever free for 24 hours without any fever reducing medications like Tylenol or ibuprofen before returning to work

## 2025-02-21 ENCOUNTER — HOSPITAL ENCOUNTER (OUTPATIENT)
Dept: MAMMOGRAPHY | Facility: MEDICAL CENTER | Age: 45
Discharge: HOME | End: 2025-02-21
Payer: COMMERCIAL

## 2025-02-21 VITALS — BODY MASS INDEX: 24.1 KG/M2 | WEIGHT: 136 LBS | HEIGHT: 63 IN

## 2025-02-21 DIAGNOSIS — Z12.31 ENCOUNTER FOR SCREENING MAMMOGRAM FOR MALIGNANT NEOPLASM OF BREAST: ICD-10-CM

## 2025-02-21 PROCEDURE — 77063 BREAST TOMOSYNTHESIS BI: CPT

## 2025-02-21 PROCEDURE — 77067 SCR MAMMO BI INCL CAD: CPT
